# Patient Record
Sex: MALE | Race: WHITE | NOT HISPANIC OR LATINO
[De-identification: names, ages, dates, MRNs, and addresses within clinical notes are randomized per-mention and may not be internally consistent; named-entity substitution may affect disease eponyms.]

---

## 2017-01-04 ENCOUNTER — TRANSCRIPTION ENCOUNTER (OUTPATIENT)
Age: 32
End: 2017-01-04

## 2017-01-04 ENCOUNTER — INPATIENT (INPATIENT)
Facility: HOSPITAL | Age: 32
LOS: 0 days | Discharge: HOME | DRG: 274 | End: 2017-01-05
Attending: EMERGENCY MEDICINE | Admitting: INTERNAL MEDICINE
Payer: COMMERCIAL

## 2017-01-04 VITALS
RESPIRATION RATE: 18 BRPM | HEART RATE: 74 BPM | TEMPERATURE: 98 F | OXYGEN SATURATION: 99 % | DIASTOLIC BLOOD PRESSURE: 79 MMHG | SYSTOLIC BLOOD PRESSURE: 134 MMHG

## 2017-01-04 DIAGNOSIS — I51.9 HEART DISEASE, UNSPECIFIED: Chronic | ICD-10-CM

## 2017-01-04 DIAGNOSIS — Z98.890 OTHER SPECIFIED POSTPROCEDURAL STATES: Chronic | ICD-10-CM

## 2017-01-04 DIAGNOSIS — I48.0 PAROXYSMAL ATRIAL FIBRILLATION: ICD-10-CM

## 2017-01-04 LAB
BLD GP AB SCN SERPL QL: SIGNIFICANT CHANGE UP
TYPE + AB SCN PNL BLD: SIGNIFICANT CHANGE UP

## 2017-01-04 PROCEDURE — 93010 ELECTROCARDIOGRAM REPORT: CPT

## 2017-01-04 RX ORDER — INFLUENZA VIRUS VACCINE 15; 15; 15; 15 UG/.5ML; UG/.5ML; UG/.5ML; UG/.5ML
0.5 SUSPENSION INTRAMUSCULAR ONCE
Qty: 0 | Refills: 0 | Status: COMPLETED | OUTPATIENT
Start: 2017-01-04 | End: 2017-01-05

## 2017-01-04 RX ORDER — COLCHICINE 0.6 MG
0.6 TABLET ORAL DAILY
Qty: 0 | Refills: 0 | Status: DISCONTINUED | OUTPATIENT
Start: 2017-01-04 | End: 2017-01-05

## 2017-01-04 RX ORDER — ONDANSETRON 8 MG/1
4 TABLET, FILM COATED ORAL ONCE
Qty: 0 | Refills: 0 | Status: DISCONTINUED | OUTPATIENT
Start: 2017-01-04 | End: 2017-01-05

## 2017-01-04 RX ORDER — ACETAMINOPHEN 500 MG
650 TABLET ORAL EVERY 6 HOURS
Qty: 0 | Refills: 0 | Status: DISCONTINUED | OUTPATIENT
Start: 2017-01-04 | End: 2017-01-05

## 2017-01-04 RX ORDER — FENTANYL CITRATE 50 UG/ML
25 INJECTION INTRAVENOUS
Qty: 0 | Refills: 0 | Status: DISCONTINUED | OUTPATIENT
Start: 2017-01-04 | End: 2017-01-05

## 2017-01-04 RX ORDER — APIXABAN 2.5 MG/1
5 TABLET, FILM COATED ORAL
Qty: 0 | Refills: 0 | Status: DISCONTINUED | OUTPATIENT
Start: 2017-01-04 | End: 2017-01-05

## 2017-01-04 RX ORDER — LANOLIN ALCOHOL/MO/W.PET/CERES
3 CREAM (GRAM) TOPICAL ONCE
Qty: 0 | Refills: 0 | Status: COMPLETED | OUTPATIENT
Start: 2017-01-04 | End: 2017-01-04

## 2017-01-04 RX ORDER — ALPRAZOLAM 0.25 MG
0.25 TABLET ORAL EVERY 6 HOURS
Qty: 0 | Refills: 0 | Status: DISCONTINUED | OUTPATIENT
Start: 2017-01-04 | End: 2017-01-05

## 2017-01-04 RX ORDER — PANTOPRAZOLE SODIUM 20 MG/1
40 TABLET, DELAYED RELEASE ORAL
Qty: 0 | Refills: 0 | Status: DISCONTINUED | OUTPATIENT
Start: 2017-01-04 | End: 2017-01-05

## 2017-01-04 RX ORDER — ACETAMINOPHEN 500 MG
1000 TABLET ORAL ONCE
Qty: 0 | Refills: 0 | Status: COMPLETED | OUTPATIENT
Start: 2017-01-04 | End: 2017-01-04

## 2017-01-04 RX ORDER — BENZOCAINE AND MENTHOL 5; 1 G/100ML; G/100ML
1 LIQUID ORAL
Qty: 0 | Refills: 0 | Status: DISCONTINUED | OUTPATIENT
Start: 2017-01-04 | End: 2017-01-05

## 2017-01-04 RX ORDER — METOPROLOL TARTRATE 50 MG
5 TABLET ORAL ONCE
Qty: 0 | Refills: 0 | Status: COMPLETED | OUTPATIENT
Start: 2017-01-04 | End: 2017-01-04

## 2017-01-04 RX ADMIN — Medication 400 MILLIGRAM(S): at 14:07

## 2017-01-04 RX ADMIN — APIXABAN 5 MILLIGRAM(S): 2.5 TABLET, FILM COATED ORAL at 18:00

## 2017-01-04 RX ADMIN — Medication 3 MILLIGRAM(S): at 22:46

## 2017-01-04 RX ADMIN — FENTANYL CITRATE 25 MICROGRAM(S): 50 INJECTION INTRAVENOUS at 13:30

## 2017-01-04 RX ADMIN — Medication 650 MILLIGRAM(S): at 18:05

## 2017-01-04 RX ADMIN — Medication 650 MILLIGRAM(S): at 17:05

## 2017-01-04 RX ADMIN — BENZOCAINE AND MENTHOL 1 LOZENGE: 5; 1 LIQUID ORAL at 18:00

## 2017-01-04 RX ADMIN — FENTANYL CITRATE 25 MICROGRAM(S): 50 INJECTION INTRAVENOUS at 14:45

## 2017-01-04 RX ADMIN — FENTANYL CITRATE 25 MICROGRAM(S): 50 INJECTION INTRAVENOUS at 15:08

## 2017-01-04 RX ADMIN — PANTOPRAZOLE SODIUM 40 MILLIGRAM(S): 20 TABLET, DELAYED RELEASE ORAL at 22:46

## 2017-01-04 RX ADMIN — FENTANYL CITRATE 25 MICROGRAM(S): 50 INJECTION INTRAVENOUS at 14:37

## 2017-01-04 RX ADMIN — Medication 5 MILLIGRAM(S): at 21:28

## 2017-01-04 RX ADMIN — Medication 1000 MILLIGRAM(S): at 15:08

## 2017-01-04 NOTE — PROGRESS NOTE ADULT - SUBJECTIVE AND OBJECTIVE BOX
S/P successful afib ablation  H/O recent AVNRT ablation  Plan eliquis tonight  Colchicine and protonix 1 month  Telemetry overnight  Discharge in AM   Follow up in 4 weeks as outpatient S/P successful afib ablation  H/O recent AVNRT ablation  Plan eliquis tonight  Colchicine and protonix 1 month  Telemetry overnight   Right and left groin sites benign  Discharge in AM if stable  Follow up in 4 weeks as outpatient

## 2017-01-04 NOTE — DISCHARGE NOTE ADULT - MEDICATION SUMMARY - MEDICATIONS TO TAKE
I will START or STAY ON the medications listed below when I get home from the hospital:    acetaminophen 325 mg oral tablet  -- 2 tab(s) by mouth every 6 hours, As needed, Mild Pain (1 - 3)  -- Indication: For Post ablation pain    Eliquis 5 mg oral tablet  -- 1 tab(s) by mouth 2 times a day  -- Indication: For Paroxysmal atrial fibrillation    colchicine 0.6 mg oral tablet  -- 1 tab(s) by mouth once a day MDD:Take for 30 days then STOP.  -- Indication: For Post ablation    pantoprazole 40 mg oral delayed release tablet  -- 1 tab(s) by mouth once a day (before a meal)  Take for 30 days, then STOP.   -- Indication: For Post ablation GI prophylaxis

## 2017-01-04 NOTE — DISCHARGE NOTE ADULT - NS AS ACTIVITY OBS
Showering allowed/Return to Work/School allowed/No Heavy lifting/straining/Walking-Indoors allowed/Walking-Outdoors allowed/Stairs allowed

## 2017-01-04 NOTE — DISCHARGE NOTE ADULT - INSTRUCTIONS
Choose lean meats and poultry without skin and prepare them without added saturated and trans fat.  Eat fish at least twice a week. Recent research shows that eating oily fish containing omega-3 fatty acids (for example, salmon, trout and herring) may help lower your risk of death from coronary artery disease.  Select fat-free, 1 percent fat and low-fat dairy products.  Cut back on foods containing partially hydrogenated vegetable oils to reduce trans fat in your diet.   To lower cholesterol, reduce saturated fat to no more than 5 to 6 percent of total calories. For someone eating 2,000 calories a day, that’s about 13 grams of saturated fat.  Cut back on beverages and foods with added sugars.  Choose and prepare foods with little or no salt. To lower blood pressure, aim to eat no more than 2,400 milligrams of sodium per day. Reducing daily intake to 1,500 mg is desirable because it can lower blood pressure even further.  If you drink alcohol, drink in moderation. That means one drink per day if you’re a woman and two drinks  per day if you’re a man.  Follow the American Heart Association recommendations when you eat out, and keep an eye on your portion sizes.

## 2017-01-04 NOTE — DISCHARGE NOTE ADULT - CARE PLAN
Principal Discharge DX:	Paroxysmal atrial fibrillation  Goal:	Minimize arrhythmia burden and optimize cardiac health  Instructions for follow-up, activity and diet:	- Bruising at the groin, sometimes extending down the leg, and/or a small lump under the skin at the groin access site is normal and will resolve within 2 – 3 weeks.   - Occasional skipped beats or palpitations that last for a few beats are common and generally resolve within 1-2 months.   - You make walk and take stairs at a regular pace.   - Do not perform any exercise more strenuous than walking for 1 week.   - Do not strain or lift heavy objects for 1 week.  - You may shower the day after the procedure.  - Do not soak in water (such as tub baths, hot tubs, swimming, etc.) for 1 week.   - You may resume all other activities the day after the procedure.  Call your doctor if:   - you notice bleeding, redness, drainage, swelling, increased tenderness or a hot sensation around the catheter insertion site.   - your temperature is greater than 100 degrees F for more than 24 hours.  - your rapid heart rhythm returns.  - you have any questions or concerns regarding the procedure.  If significant bleeding and/or a large lump (the size of a golf ball or bigger) occurs:  - Lie flat and apply continuous direct pressure just above the puncture site for at least 10 minutes  - If the issue resolves, notify your physician immediately.    - If the bleeding cannot be controlled, please seek immediate medical attention.  If you experience increased difficulty breathing or chest pain, or if you faint or have dizzy spells, please seek immediate medical attention.

## 2017-01-04 NOTE — DISCHARGE NOTE ADULT - PLAN OF CARE
Minimize arrhythmia burden and optimize cardiac health - Bruising at the groin, sometimes extending down the leg, and/or a small lump under the skin at the groin access site is normal and will resolve within 2 – 3 weeks.   - Occasional skipped beats or palpitations that last for a few beats are common and generally resolve within 1-2 months.   - You make walk and take stairs at a regular pace.   - Do not perform any exercise more strenuous than walking for 1 week.   - Do not strain or lift heavy objects for 1 week.  - You may shower the day after the procedure.  - Do not soak in water (such as tub baths, hot tubs, swimming, etc.) for 1 week.   - You may resume all other activities the day after the procedure.  Call your doctor if:   - you notice bleeding, redness, drainage, swelling, increased tenderness or a hot sensation around the catheter insertion site.   - your temperature is greater than 100 degrees F for more than 24 hours.  - your rapid heart rhythm returns.  - you have any questions or concerns regarding the procedure.  If significant bleeding and/or a large lump (the size of a golf ball or bigger) occurs:  - Lie flat and apply continuous direct pressure just above the puncture site for at least 10 minutes  - If the issue resolves, notify your physician immediately.    - If the bleeding cannot be controlled, please seek immediate medical attention.  If you experience increased difficulty breathing or chest pain, or if you faint or have dizzy spells, please seek immediate medical attention.

## 2017-01-04 NOTE — DISCHARGE NOTE ADULT - HOSPITAL COURSE
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 Episodes of paroxysmal atrial fibrillation correlating with palpitations were noted on the ILR. The patient presented electively 1/4/16 for cryoablation of atrial fibrillation, which was performed without acute complication. 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 Episodes of paroxysmal atrial fibrillation correlating with palpitations were noted on the ILR. The patient presented electively 1/4/16 for cryoablation of atrial fibrillation, which was performed without acute complication. The patient was observed overnight without event and was discharged home the following morning with a plan for outpatient follow up.

## 2017-01-04 NOTE — DISCHARGE NOTE ADULT - PATIENT PORTAL LINK FT
“You can access the FollowHealth Patient Portal, offered by [facility], by registering with the following website: [email or web address]”

## 2017-01-04 NOTE — DISCHARGE NOTE ADULT - CARE PROVIDER_API CALL
George Dent (MD), Cardiac Electrophysiology; Cardiovascular Disease; Internal Medicine  [address] [place] [address]  Phone: [phone]  Fax: [phone]

## 2017-01-05 VITALS — RESPIRATION RATE: 20 BRPM | OXYGEN SATURATION: 96 % | HEART RATE: 94 BPM

## 2017-01-05 LAB
ANION GAP SERPL CALC-SCNC: 12 MMOL/L — SIGNIFICANT CHANGE UP (ref 5–17)
BUN SERPL-MCNC: 13 MG/DL — SIGNIFICANT CHANGE UP (ref 8–20)
CALCIUM SERPL-MCNC: 8.7 MG/DL — SIGNIFICANT CHANGE UP (ref 8.6–10.2)
CHLORIDE SERPL-SCNC: 104 MMOL/L — SIGNIFICANT CHANGE UP (ref 98–107)
CO2 SERPL-SCNC: 23 MMOL/L — SIGNIFICANT CHANGE UP (ref 22–29)
CREAT SERPL-MCNC: 0.73 MG/DL — SIGNIFICANT CHANGE UP (ref 0.5–1.3)
GLUCOSE SERPL-MCNC: 133 MG/DL — HIGH (ref 70–115)
HCT VFR BLD CALC: 41.9 % — LOW (ref 42–52)
HGB BLD-MCNC: 14.6 G/DL — SIGNIFICANT CHANGE UP (ref 14–18)
MAGNESIUM SERPL-MCNC: 1.9 MG/DL — SIGNIFICANT CHANGE UP (ref 1.8–2.5)
MCHC RBC-ENTMCNC: 30.5 PG — SIGNIFICANT CHANGE UP (ref 27–31)
MCHC RBC-ENTMCNC: 34.8 G/DL — SIGNIFICANT CHANGE UP (ref 32–36)
MCV RBC AUTO: 87.7 FL — SIGNIFICANT CHANGE UP (ref 80–94)
PLATELET # BLD AUTO: 160 K/UL — SIGNIFICANT CHANGE UP (ref 150–400)
POTASSIUM SERPL-MCNC: 3.9 MMOL/L — SIGNIFICANT CHANGE UP (ref 3.5–5.3)
POTASSIUM SERPL-SCNC: 3.9 MMOL/L — SIGNIFICANT CHANGE UP (ref 3.5–5.3)
RBC # BLD: 4.78 M/UL — SIGNIFICANT CHANGE UP (ref 4.6–6.2)
RBC # FLD: 12.8 % — SIGNIFICANT CHANGE UP (ref 11–15.6)
SODIUM SERPL-SCNC: 139 MMOL/L — SIGNIFICANT CHANGE UP (ref 135–145)
WBC # BLD: 14.12 K/UL — HIGH (ref 4.8–10.8)
WBC # FLD AUTO: 14.12 K/UL — HIGH (ref 4.8–10.8)

## 2017-01-05 PROCEDURE — C1730: CPT

## 2017-01-05 PROCEDURE — 93662 INTRACARDIAC ECG (ICE): CPT

## 2017-01-05 PROCEDURE — 80048 BASIC METABOLIC PNL TOTAL CA: CPT

## 2017-01-05 PROCEDURE — 36415 COLL VENOUS BLD VENIPUNCTURE: CPT

## 2017-01-05 PROCEDURE — 93005 ELECTROCARDIOGRAM TRACING: CPT

## 2017-01-05 PROCEDURE — C1894: CPT

## 2017-01-05 PROCEDURE — 83735 ASSAY OF MAGNESIUM: CPT

## 2017-01-05 PROCEDURE — 86920 COMPATIBILITY TEST SPIN: CPT

## 2017-01-05 PROCEDURE — 93656 COMPRE EP EVAL ABLTJ ATR FIB: CPT

## 2017-01-05 PROCEDURE — 86850 RBC ANTIBODY SCREEN: CPT

## 2017-01-05 PROCEDURE — 93613 INTRACARDIAC EPHYS 3D MAPG: CPT

## 2017-01-05 PROCEDURE — 85027 COMPLETE CBC AUTOMATED: CPT

## 2017-01-05 PROCEDURE — C1893: CPT

## 2017-01-05 PROCEDURE — C1766: CPT

## 2017-01-05 PROCEDURE — 86900 BLOOD TYPING SEROLOGIC ABO: CPT

## 2017-01-05 PROCEDURE — 90686 IIV4 VACC NO PRSV 0.5 ML IM: CPT

## 2017-01-05 PROCEDURE — 93010 ELECTROCARDIOGRAM REPORT: CPT

## 2017-01-05 PROCEDURE — C1769: CPT

## 2017-01-05 PROCEDURE — C1733: CPT

## 2017-01-05 PROCEDURE — 86901 BLOOD TYPING SEROLOGIC RH(D): CPT

## 2017-01-05 RX ORDER — COLCHICINE 0.6 MG
1 TABLET ORAL
Qty: 30 | Refills: 0 | OUTPATIENT
Start: 2017-01-05 | End: 2017-02-04

## 2017-01-05 RX ORDER — PANTOPRAZOLE SODIUM 20 MG/1
1 TABLET, DELAYED RELEASE ORAL
Qty: 30 | Refills: 0 | OUTPATIENT
Start: 2017-01-05 | End: 2017-02-04

## 2017-01-05 RX ORDER — MAGNESIUM SULFATE 500 MG/ML
2 VIAL (ML) INJECTION ONCE
Qty: 0 | Refills: 0 | Status: COMPLETED | OUTPATIENT
Start: 2017-01-05 | End: 2017-01-05

## 2017-01-05 RX ORDER — ACETAMINOPHEN 500 MG
2 TABLET ORAL
Qty: 0 | Refills: 0 | COMMUNITY
Start: 2017-01-05

## 2017-01-05 RX ORDER — POTASSIUM CHLORIDE 20 MEQ
20 PACKET (EA) ORAL ONCE
Qty: 0 | Refills: 0 | Status: COMPLETED | OUTPATIENT
Start: 2017-01-05 | End: 2017-01-05

## 2017-01-05 RX ADMIN — Medication 20 MILLIEQUIVALENT(S): at 08:10

## 2017-01-05 RX ADMIN — PANTOPRAZOLE SODIUM 40 MILLIGRAM(S): 20 TABLET, DELAYED RELEASE ORAL at 06:20

## 2017-01-05 RX ADMIN — Medication 50 GRAM(S): at 08:10

## 2017-01-05 RX ADMIN — APIXABAN 5 MILLIGRAM(S): 2.5 TABLET, FILM COATED ORAL at 06:20

## 2017-01-05 RX ADMIN — Medication 0.6 MILLIGRAM(S): at 01:49

## 2017-01-05 RX ADMIN — INFLUENZA VIRUS VACCINE 0.5 MILLILITER(S): 15; 15; 15; 15 SUSPENSION INTRAMUSCULAR at 10:26

## 2017-01-05 NOTE — PROGRESS NOTE ADULT - SUBJECTIVE AND OBJECTIVE BOX
Pt doing well POD #1 s/p cryoablation of atrial fibrillation. Denies complaint.     EKG: sinus rhythm at 90 bpm w/ intact conduction, normal axis & intervals  TELE: sinus rhythm, intact conduction, no acute changes or sustained arrhythmias.     MEDICATIONS  (STANDING):  pantoprazole    Tablet 40milliGRAM(s) Oral before breakfast  apixaban 5milliGRAM(s) Oral <User Schedule>  colchicine 0.6milliGRAM(s) Oral daily  influenza   Vaccine 0.5milliLiter(s) IntraMuscular once  potassium chloride    Tablet ER 20milliEquivalent(s) Oral once  magnesium sulfate  IVPB 2Gram(s) IV Intermittent once    MEDICATIONS  (PRN):  ondansetron Injectable 4milliGRAM(s) IV Push once PRN Nausea and/or Vomiting  oxyCODONE  5 mG/acetaminophen 325 mG 2Tablet(s) Oral every 4 hours PRN Severe Pain (7 - 10)  fentaNYL    Injectable 25MICROGram(s) IV Push every 30 minutes PRN Severe Pain (7 - 10)  acetaminophen   Tablet. 650milliGRAM(s) Oral every 6 hours PRN Mild Pain (1 - 3)  oxyCODONE  5 mG/acetaminophen 325 mG 1Tablet(s) Oral every 4 hours PRN Moderate Pain (4 - 6)  ALPRAZolam 0.25milliGRAM(s) Oral every 6 hours PRN anxiety or insomnia  benzocaine 15 mG/menthol 3.6 mG Lozenge 1Lozenge Oral every 2 hours PRN Sore Throat      Allergies  No Known Drug Allergies  bee stings (Anaphylaxis)  shrimp (Anaphylaxis; Rash; Urticaria)      PAST MEDICAL & SURGICAL HISTORY:  SVT (supraventricular tachycardia): May 2016 ablation  Dizziness  CHAMBERS (dyspnea on exertion)  Palpitations  Paroxysmal a-fib: Insertion of [employer] Loop Linq cardiac event recorder Nov 2016  Afib: PAF captured on ILR 25% AF over 24 hours  Asthma  H/O atrioventricular montserrat ablation: SVT ablation May 2016  Cardiac disorder: [employer] Loop Linq  mplantable loop recorder Nov. 2016  No significant past surgical history      Vital Signs Last 24 Hrs  T(C): 36.6, Max: 36.9 (01-04 @ 08:15)  T(F): 97.9, Max: 98.4 (01-04 @ 08:15)  HR: 91 (74 - 120)  BP: 124/68 (124/68 - 140/81)  BP(mean): 91 (91 - 105)  RR: 21 (12 - 36)  SpO2: 96% (94% - 99%)    Physical Exam:  Constitutional: NAD, AAOx3  Cardiovascular: +S1S2 RRR  Pulmonary: CTA b/l, unlabored  Abd: soft NTND +BS  Groins: C/D/I bilaterally; no bleeding, hematoma, edema  Extremities: no pedal edema, +distal pulses b/l  Neuro: non focal, VILCHIS x4    LABS:                        14.6   14.12 )-----------( 160      ( 05 Jan 2017 04:02 )             41.9     05 Jan 2017 04:02    139    |  104    |  13.0   ----------------------------<  133    3.9     |  23.0   |  0.73     Ca    8.7        05 Jan 2017 04:02  Mg     1.9       05 Jan 2017 04:02      Assessment:   30 y/o male h/o  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     Plan:   Replete Mg & K   OOB w/ assist now; progress as tolerated.   Continue Eliquis - importance of strict compliance reinforced w/ pt who expressed understanding.   Protonix & colchicine x 1 month.   Access site care and activity limitations reviewed w/ pt.   Outpt f/up w/ Dr. Rojas within 4 weeks.   Anticipate d/c home following successful ambulation.

## 2017-01-05 NOTE — PROGRESS NOTE ADULT - SUBJECTIVE AND OBJECTIVE BOX
Patient is a 31y old  Male who presents with a chief complaint of elective cryoablation of symptomatic atrial fibrillation. (04 Jan 2017 15:36)    PAST MEDICAL & SURGICAL HISTORY:  SVT (supraventricular tachycardia): May 2016 ablation  Dizziness  CHAMBERS (dyspnea on exertion)  Palpitations  Paroxysmal a-fib: Insertion of [employer] Loop Linq cardiac event recorder Nov 2016  Afib: PAF captured on ILR 25% AF over 24 hours  Asthma  H/O atrioventricular montserrat ablation: SVT ablation May 2016  Cardiac disorder: Med[employer] Loop Linq  mplantable loop recorder Nov. 2016  No significant past surgical history      BRIEF HOSPITAL COURSE:   s/p afib ablation in SR but tachy     Events last 24 hours:   BB given with good effect      Review of Systems:  All  ROS are negative.  Medications:        ondansetron Injectable 4milliGRAM(s) IV Push once PRN  oxyCODONE  5 mG/acetaminophen 325 mG 2Tablet(s) Oral every 4 hours PRN  fentaNYL    Injectable 25MICROGram(s) IV Push every 30 minutes PRN  acetaminophen   Tablet. 650milliGRAM(s) Oral every 6 hours PRN  oxyCODONE  5 mG/acetaminophen 325 mG 1Tablet(s) Oral every 4 hours PRN  ALPRAZolam 0.25milliGRAM(s) Oral every 6 hours PRN      apixaban 5milliGRAM(s) Oral <User Schedule>    pantoprazole    Tablet 40milliGRAM(s) Oral before breakfast      colchicine 0.6milliGRAM(s) Oral daily      influenza   Vaccine 0.5milliLiter(s) IntraMuscular once    benzocaine 15 mG/menthol 3.6 mG Lozenge 1Lozenge Oral every 2 hours PRN        ICU Vital Signs Last 24 Hrs  T(C): 36.5, Max: 36.9 (01-04 @ 08:15)  T(F): 97.7, Max: 98.4 (01-04 @ 08:15)  HR: 104 (74 - 120)  BP: 140/81 (127/73 - 140/81)  BP(mean): 105 (98 - 105)  ABP: 138/72 (131/69 - 179/101)  ABP(mean): 95 (87 - 124)  RR: 24 (14 - 36)  SpO2: 95% (94% - 99%)    Physical Examination:    General:     HEENT:   no JVD    PULM: clear     CVS: s1 s2 tachy     ABD: soft   groins without hematoma      EXT: warm no edema     SKIN:  warm     Neuro: alert non focal        LABS:

## 2017-02-12 ENCOUNTER — EMERGENCY (EMERGENCY)
Facility: HOSPITAL | Age: 32
LOS: 1 days | Discharge: HOME | End: 2017-02-12
Attending: STUDENT IN AN ORGANIZED HEALTH CARE EDUCATION/TRAINING PROGRAM
Payer: COMMERCIAL

## 2017-02-12 VITALS
DIASTOLIC BLOOD PRESSURE: 103 MMHG | HEIGHT: 70 IN | SYSTOLIC BLOOD PRESSURE: 147 MMHG | WEIGHT: 214.95 LBS | OXYGEN SATURATION: 97 % | HEART RATE: 135 BPM | RESPIRATION RATE: 20 BRPM | TEMPERATURE: 98 F

## 2017-02-12 VITALS — HEART RATE: 73 BPM

## 2017-02-12 DIAGNOSIS — S01.01XA LACERATION WITHOUT FOREIGN BODY OF SCALP, INITIAL ENCOUNTER: ICD-10-CM

## 2017-02-12 DIAGNOSIS — Z91.030 BEE ALLERGY STATUS: ICD-10-CM

## 2017-02-12 DIAGNOSIS — Z98.890 OTHER SPECIFIED POSTPROCEDURAL STATES: Chronic | ICD-10-CM
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
## 2017-02-12 DIAGNOSIS — J45.909 UNSPECIFIED ASTHMA, UNCOMPLICATED: ICD-10-CM
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
## 2017-02-12 DIAGNOSIS — I51.9 HEART DISEASE, UNSPECIFIED: Chronic | ICD-10-CM

## 2017-02-12 DIAGNOSIS — Z91.013 ALLERGY TO SEAFOOD: ICD-10-CM

## 2017-02-12 DIAGNOSIS — I48.91 UNSPECIFIED ATRIAL FIBRILLATION: ICD-10-CM
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
## 2017-02-12 PROCEDURE — 93010 ELECTROCARDIOGRAM REPORT: CPT

## 2017-02-12 PROCEDURE — 70450 CT HEAD/BRAIN W/O DYE: CPT | Mod: 26

## 2017-02-12 PROCEDURE — 12001 RPR S/N/AX/GEN/TRNK 2.5CM/<: CPT

## 2017-02-12 PROCEDURE — 70450 CT HEAD/BRAIN W/O DYE: CPT

## 2017-02-12 PROCEDURE — 99284 EMERGENCY DEPT VISIT MOD MDM: CPT | Mod: 25

## 2017-02-12 PROCEDURE — 93005 ELECTROCARDIOGRAM TRACING: CPT

## 2017-02-12 NOTE — ED PROVIDER NOTE - OBJECTIVE STATEMENT
32 yo male with slip and a fall on ice when he got home this morning presents for evaluation due to bleeding head wound.

## 2017-02-12 NOTE — ED ADULT NURSE NOTE - PSH
Cardiac disorder  [employer] Loop Linq  mplantable loop recorder Nov. 2016  H/O atrioventricular montserrat ablation  SVT ablation May 2016

## 2017-02-12 NOTE — ED ADULT NURSE NOTE - PMH
Afib  PAF captured on ILR 25% AF over 24 hours  Asthma    Dizziness    CHAMBERS (dyspnea on exertion)    Palpitations    Paroxysmal a-fib  Insertion of [employer] Loop Linq cardiac event recorder Nov 2016  SVT (supraventricular tachycardia)  May 2016 ablation

## 2017-02-12 NOTE — ED PROVIDER NOTE - PSH
Cardiac disorder  [employer] Loop Linq  mplantable loop recorder Nov. 2016  H/O atrioventricular montserrat ablation  SVT ablation May 2016

## 2017-02-12 NOTE — ED ADULT NURSE NOTE - OBJECTIVE STATEMENT
pt reports fall and slip on ice, hit head hematoma top of head, no active bleeding at this time, pt on elaquis

## 2017-02-12 NOTE — ED PROVIDER NOTE - PMH
Afib  PAF captured on ILR 25% AF over 24 hours  Asthma    Dizziness    CHAMBERS (dyspnea on exertion)    Palpitations    Paroxysmal a-fib  Insertion of [employer] Loop Linq cardiac event recorder Nov 2016  SVT (supraventricular tachycardia)  May 2016 ablation

## 2017-02-12 NOTE — ED ADULT TRIAGE NOTE - CHIEF COMPLAINT QUOTE
slipped on ice hit  head lac  eliqis no loc slipped on ice hit  head lac   im on eliqis no loc slipped on ice hit  head lac   im on eliqis no loc  hx afib ablation x 2

## 2017-03-06 ENCOUNTER — RECORD ABSTRACTING (OUTPATIENT)
Age: 32
End: 2017-03-06

## 2017-03-06 DIAGNOSIS — Z86.79 PERSONAL HISTORY OF OTHER DISEASES OF THE CIRCULATORY SYSTEM: ICD-10-CM

## 2017-03-06 DIAGNOSIS — Z87.898 PERSONAL HISTORY OF OTHER SPECIFIED CONDITIONS: ICD-10-CM

## 2017-03-08 ENCOUNTER — APPOINTMENT (OUTPATIENT)
Dept: ELECTROPHYSIOLOGY | Facility: CLINIC | Age: 32
End: 2017-03-08

## 2017-04-07 ENCOUNTER — APPOINTMENT (OUTPATIENT)
Dept: ELECTROPHYSIOLOGY | Facility: CLINIC | Age: 32
End: 2017-04-07
Payer: COMMERCIAL

## 2017-04-07 PROCEDURE — 93298 REM INTERROG DEV EVAL SCRMS: CPT

## 2017-05-08 ENCOUNTER — APPOINTMENT (OUTPATIENT)
Dept: ELECTROPHYSIOLOGY | Facility: CLINIC | Age: 32
End: 2017-05-08

## 2017-06-12 ENCOUNTER — APPOINTMENT (OUTPATIENT)
Dept: ELECTROPHYSIOLOGY | Facility: CLINIC | Age: 32
End: 2017-06-12

## 2017-07-10 ENCOUNTER — APPOINTMENT (OUTPATIENT)
Dept: ELECTROPHYSIOLOGY | Facility: CLINIC | Age: 32
End: 2017-07-10
Payer: COMMERCIAL

## 2017-07-10 ENCOUNTER — APPOINTMENT (OUTPATIENT)
Dept: ELECTROPHYSIOLOGY | Facility: CLINIC | Age: 32
End: 2017-07-10

## 2017-07-10 PROCEDURE — 93298 REM INTERROG DEV EVAL SCRMS: CPT

## 2017-08-07 ENCOUNTER — APPOINTMENT (OUTPATIENT)
Dept: ELECTROPHYSIOLOGY | Facility: CLINIC | Age: 32
End: 2017-08-07
Payer: COMMERCIAL

## 2017-08-07 PROCEDURE — 93298 REM INTERROG DEV EVAL SCRMS: CPT

## 2017-09-05 ENCOUNTER — APPOINTMENT (OUTPATIENT)
Dept: ELECTROPHYSIOLOGY | Facility: CLINIC | Age: 32
End: 2017-09-05
Payer: COMMERCIAL

## 2017-09-05 PROCEDURE — 93298 REM INTERROG DEV EVAL SCRMS: CPT

## 2017-10-05 ENCOUNTER — APPOINTMENT (OUTPATIENT)
Dept: ELECTROPHYSIOLOGY | Facility: CLINIC | Age: 32
End: 2017-10-05
Payer: COMMERCIAL

## 2017-10-05 PROCEDURE — 93298 REM INTERROG DEV EVAL SCRMS: CPT

## 2017-10-09 ENCOUNTER — APPOINTMENT (OUTPATIENT)
Dept: ELECTROPHYSIOLOGY | Facility: CLINIC | Age: 32
End: 2017-10-09
Payer: COMMERCIAL

## 2017-10-09 VITALS
SYSTOLIC BLOOD PRESSURE: 145 MMHG | DIASTOLIC BLOOD PRESSURE: 86 MMHG | HEIGHT: 70 IN | BODY MASS INDEX: 32.21 KG/M2 | HEART RATE: 83 BPM | OXYGEN SATURATION: 97 % | WEIGHT: 225 LBS

## 2017-10-09 PROCEDURE — 93285 PRGRMG DEV EVAL SCRMS IP: CPT

## 2017-10-09 PROCEDURE — 99214 OFFICE O/P EST MOD 30 MIN: CPT

## 2017-10-20 ENCOUNTER — TRANSCRIPTION ENCOUNTER (OUTPATIENT)
Age: 32
End: 2017-10-20

## 2017-11-06 ENCOUNTER — APPOINTMENT (OUTPATIENT)
Dept: ELECTROPHYSIOLOGY | Facility: CLINIC | Age: 32
End: 2017-11-06
Payer: COMMERCIAL

## 2017-11-06 PROCEDURE — 93298 REM INTERROG DEV EVAL SCRMS: CPT

## 2017-12-04 ENCOUNTER — APPOINTMENT (OUTPATIENT)
Dept: ELECTROPHYSIOLOGY | Facility: CLINIC | Age: 32
End: 2017-12-04
Payer: COMMERCIAL

## 2017-12-04 PROCEDURE — 93298 REM INTERROG DEV EVAL SCRMS: CPT

## 2018-01-03 ENCOUNTER — APPOINTMENT (OUTPATIENT)
Dept: ELECTROPHYSIOLOGY | Facility: CLINIC | Age: 33
End: 2018-01-03
Payer: COMMERCIAL

## 2018-01-03 PROCEDURE — 93298 REM INTERROG DEV EVAL SCRMS: CPT

## 2018-01-29 ENCOUNTER — NON-APPOINTMENT (OUTPATIENT)
Age: 33
End: 2018-01-29

## 2018-01-29 ENCOUNTER — APPOINTMENT (OUTPATIENT)
Dept: ELECTROPHYSIOLOGY | Facility: CLINIC | Age: 33
End: 2018-01-29
Payer: COMMERCIAL

## 2018-01-29 VITALS
HEART RATE: 75 BPM | BODY MASS INDEX: 32.21 KG/M2 | HEIGHT: 70 IN | WEIGHT: 225 LBS | DIASTOLIC BLOOD PRESSURE: 98 MMHG | SYSTOLIC BLOOD PRESSURE: 147 MMHG

## 2018-01-29 VITALS — WEIGHT: 225 LBS | HEIGHT: 70 IN | BODY MASS INDEX: 32.21 KG/M2

## 2018-01-29 PROCEDURE — 93285 PRGRMG DEV EVAL SCRMS IP: CPT

## 2018-01-29 PROCEDURE — 99214 OFFICE O/P EST MOD 30 MIN: CPT

## 2018-02-02 ENCOUNTER — APPOINTMENT (OUTPATIENT)
Dept: ELECTROPHYSIOLOGY | Facility: CLINIC | Age: 33
End: 2018-02-02
Payer: COMMERCIAL

## 2018-02-02 PROCEDURE — 93298 REM INTERROG DEV EVAL SCRMS: CPT

## 2018-02-02 PROCEDURE — 93299: CPT

## 2018-03-05 ENCOUNTER — APPOINTMENT (OUTPATIENT)
Dept: ELECTROPHYSIOLOGY | Facility: CLINIC | Age: 33
End: 2018-03-05
Payer: COMMERCIAL

## 2018-03-05 PROCEDURE — 93299: CPT

## 2018-03-05 PROCEDURE — 93298 REM INTERROG DEV EVAL SCRMS: CPT

## 2018-03-30 ENCOUNTER — OUTPATIENT (OUTPATIENT)
Dept: OUTPATIENT SERVICES | Facility: HOSPITAL | Age: 33
LOS: 1 days | End: 2018-03-30
Payer: COMMERCIAL

## 2018-03-30 VITALS
RESPIRATION RATE: 18 BRPM | WEIGHT: 220.02 LBS | SYSTOLIC BLOOD PRESSURE: 148 MMHG | OXYGEN SATURATION: 98 % | HEIGHT: 70 IN | DIASTOLIC BLOOD PRESSURE: 78 MMHG | TEMPERATURE: 98 F | HEART RATE: 71 BPM

## 2018-03-30 VITALS
OXYGEN SATURATION: 98 % | RESPIRATION RATE: 16 BRPM | DIASTOLIC BLOOD PRESSURE: 78 MMHG | TEMPERATURE: 98 F | SYSTOLIC BLOOD PRESSURE: 148 MMHG | HEART RATE: 78 BPM

## 2018-03-30 DIAGNOSIS — I51.9 HEART DISEASE, UNSPECIFIED: Chronic | ICD-10-CM

## 2018-03-30 DIAGNOSIS — Z98.890 OTHER SPECIFIED POSTPROCEDURAL STATES: Chronic | ICD-10-CM

## 2018-03-30 DIAGNOSIS — Z01.810 ENCOUNTER FOR PREPROCEDURAL CARDIOVASCULAR EXAMINATION: ICD-10-CM

## 2018-03-30 LAB
ANION GAP SERPL CALC-SCNC: 11 MMOL/L — SIGNIFICANT CHANGE UP (ref 5–17)
BASOPHILS # BLD AUTO: 0 K/UL — SIGNIFICANT CHANGE UP (ref 0–0.2)
BASOPHILS NFR BLD AUTO: 0.5 % — SIGNIFICANT CHANGE UP (ref 0–2)
BLD GP AB SCN SERPL QL: SIGNIFICANT CHANGE UP
BUN SERPL-MCNC: 15 MG/DL — SIGNIFICANT CHANGE UP (ref 8–20)
CALCIUM SERPL-MCNC: 9 MG/DL — SIGNIFICANT CHANGE UP (ref 8.6–10.2)
CHLORIDE SERPL-SCNC: 100 MMOL/L — SIGNIFICANT CHANGE UP (ref 98–107)
CO2 SERPL-SCNC: 26 MMOL/L — SIGNIFICANT CHANGE UP (ref 22–29)
CREAT SERPL-MCNC: 0.78 MG/DL — SIGNIFICANT CHANGE UP (ref 0.5–1.3)
EOSINOPHIL # BLD AUTO: 0.1 K/UL — SIGNIFICANT CHANGE UP (ref 0–0.5)
EOSINOPHIL NFR BLD AUTO: 1.1 % — SIGNIFICANT CHANGE UP (ref 0–5)
GLUCOSE SERPL-MCNC: 100 MG/DL — SIGNIFICANT CHANGE UP (ref 70–115)
HCT VFR BLD CALC: 48.2 % — SIGNIFICANT CHANGE UP (ref 42–52)
HGB BLD-MCNC: 16.2 G/DL — SIGNIFICANT CHANGE UP (ref 14–18)
LYMPHOCYTES # BLD AUTO: 2.7 K/UL — SIGNIFICANT CHANGE UP (ref 1–4.8)
LYMPHOCYTES # BLD AUTO: 43.3 % — SIGNIFICANT CHANGE UP (ref 20–55)
MAGNESIUM SERPL-MCNC: 2 MG/DL — SIGNIFICANT CHANGE UP (ref 1.6–2.6)
MCHC RBC-ENTMCNC: 29.7 PG — SIGNIFICANT CHANGE UP (ref 27–31)
MCHC RBC-ENTMCNC: 33.6 G/DL — SIGNIFICANT CHANGE UP (ref 32–36)
MCV RBC AUTO: 88.4 FL — SIGNIFICANT CHANGE UP (ref 80–94)
MONOCYTES # BLD AUTO: 0.4 K/UL — SIGNIFICANT CHANGE UP (ref 0–0.8)
MONOCYTES NFR BLD AUTO: 7.1 % — SIGNIFICANT CHANGE UP (ref 3–10)
NEUTROPHILS # BLD AUTO: 3 K/UL — SIGNIFICANT CHANGE UP (ref 1.8–8)
NEUTROPHILS NFR BLD AUTO: 47.7 % — SIGNIFICANT CHANGE UP (ref 37–73)
PLATELET # BLD AUTO: 171 K/UL — SIGNIFICANT CHANGE UP (ref 150–400)
POTASSIUM SERPL-MCNC: 4.4 MMOL/L — SIGNIFICANT CHANGE UP (ref 3.5–5.3)
POTASSIUM SERPL-SCNC: 4.4 MMOL/L — SIGNIFICANT CHANGE UP (ref 3.5–5.3)
RBC # BLD: 5.45 M/UL — SIGNIFICANT CHANGE UP (ref 4.6–6.2)
RBC # FLD: 12.7 % — SIGNIFICANT CHANGE UP (ref 11–15.6)
SODIUM SERPL-SCNC: 137 MMOL/L — SIGNIFICANT CHANGE UP (ref 135–145)
TYPE + AB SCN PNL BLD: SIGNIFICANT CHANGE UP
WBC # BLD: 6.3 K/UL — SIGNIFICANT CHANGE UP (ref 4.8–10.8)
WBC # FLD AUTO: 6.3 K/UL — SIGNIFICANT CHANGE UP (ref 4.8–10.8)

## 2018-03-30 PROCEDURE — 93005 ELECTROCARDIOGRAM TRACING: CPT

## 2018-03-30 PROCEDURE — 86901 BLOOD TYPING SEROLOGIC RH(D): CPT

## 2018-03-30 PROCEDURE — 86900 BLOOD TYPING SEROLOGIC ABO: CPT

## 2018-03-30 PROCEDURE — G0463: CPT

## 2018-03-30 PROCEDURE — 36415 COLL VENOUS BLD VENIPUNCTURE: CPT

## 2018-03-30 PROCEDURE — 85027 COMPLETE CBC AUTOMATED: CPT

## 2018-03-30 PROCEDURE — 93010 ELECTROCARDIOGRAM REPORT: CPT

## 2018-03-30 PROCEDURE — 80048 BASIC METABOLIC PNL TOTAL CA: CPT

## 2018-03-30 PROCEDURE — 86850 RBC ANTIBODY SCREEN: CPT

## 2018-03-30 PROCEDURE — 83735 ASSAY OF MAGNESIUM: CPT

## 2018-03-30 NOTE — H&P PST ADULT - PSH
Cardiac disorder  [employer] Loop Linq  mplantable loop recorder Nov. 2016  H/O atrioventricular montserrat ablation  SVT ablation May 2016

## 2018-03-30 NOTE — H&P PST ADULT - ASSESSMENT
31 yo male with pmhx exercise induced asthma and a long standing history of palpitation s/p ILR implant 11/2016, [facility] and cryo AF ablation (PVI-[id number]). Pt has been having infrequent episodes of regular NCT >200bpm seen on ILR. He presents today for elective PST for EPS+/- ablation.      -npo after midnight prior to procedure

## 2018-03-30 NOTE — H&P PST ADULT - PMH
Asthma    AVNRT (AV montserrat re-entry tachycardia)  s/p slow path mod 5/2016 -[facility]  History of loop recorder  11/2016 -Filipe- Antoine  Palpitations    Paroxysmal a-fib  s/p ablation [id number]

## 2018-03-30 NOTE — H&P PST ADULT - HISTORY OF PRESENT ILLNESS
TTE 3/2/16: EF 55-60%, LA 3.9cm, mild MR TTE 3/2/16: EF 55-60%, LA 3.9cm, mild MR   denies prior stress test or cath 31 yo male with pmhx exercise induced asthma and a long standing history of palpitation s/p ILR implant 11/2016, [facility] and cryo AF ablation (PVI-[id number]). Pt has been having infrequent episodes of regular NCT >200bpm seen on ILR. They occur during exercise and break on their own. He notices "my heart racing and some SOB, different then my regular heart beat." He has been intolerant to CCB and Beta blockers. He was on short term sotalol post AF ablation, which was discontinued at follow up.  He presents today for elective PST for EPS+/- ablation. He feels well today.    TTE 3/2/16: EF 55-60%, LA 3.9cm, mild MR   denies prior stress test or cath

## 2018-03-30 NOTE — ASU PATIENT PROFILE, ADULT - PSH
Cardiac disorder  [employer] Loop Linq  mplantable loop recorder Nov. 2016  H/O atrioventricular montserrat ablation  SVT ablation May 2016

## 2018-04-03 ENCOUNTER — APPOINTMENT (OUTPATIENT)
Dept: ELECTROPHYSIOLOGY | Facility: CLINIC | Age: 33
End: 2018-04-03
Payer: COMMERCIAL

## 2018-04-03 PROCEDURE — 93299: CPT

## 2018-04-03 PROCEDURE — 93298 REM INTERROG DEV EVAL SCRMS: CPT

## 2018-04-04 ENCOUNTER — TRANSCRIPTION ENCOUNTER (OUTPATIENT)
Age: 33
End: 2018-04-04

## 2018-04-04 ENCOUNTER — OUTPATIENT (OUTPATIENT)
Dept: OUTPATIENT SERVICES | Facility: HOSPITAL | Age: 33
LOS: 1 days | End: 2018-04-04
Payer: COMMERCIAL

## 2018-04-04 VITALS
OXYGEN SATURATION: 100 % | DIASTOLIC BLOOD PRESSURE: 55 MMHG | RESPIRATION RATE: 16 BRPM | HEART RATE: 72 BPM | SYSTOLIC BLOOD PRESSURE: 110 MMHG

## 2018-04-04 VITALS — HEART RATE: 96 BPM | SYSTOLIC BLOOD PRESSURE: 143 MMHG | DIASTOLIC BLOOD PRESSURE: 87 MMHG | RESPIRATION RATE: 16 BRPM

## 2018-04-04 DIAGNOSIS — I51.9 HEART DISEASE, UNSPECIFIED: Chronic | ICD-10-CM

## 2018-04-04 DIAGNOSIS — I47.1 SUPRAVENTRICULAR TACHYCARDIA: ICD-10-CM

## 2018-04-04 DIAGNOSIS — Z98.890 OTHER SPECIFIED POSTPROCEDURAL STATES: Chronic | ICD-10-CM

## 2018-04-04 DIAGNOSIS — Z01.810 ENCOUNTER FOR PREPROCEDURAL CARDIOVASCULAR EXAMINATION: ICD-10-CM

## 2018-04-04 LAB
APTT BLD: 33.1 SEC — SIGNIFICANT CHANGE UP (ref 27.5–37.4)
BLD GP AB SCN SERPL QL: SIGNIFICANT CHANGE UP
INR BLD: 1.01 RATIO — SIGNIFICANT CHANGE UP (ref 0.88–1.16)
PROTHROM AB SERPL-ACNC: 11.1 SEC — SIGNIFICANT CHANGE UP (ref 9.8–12.7)
TYPE + AB SCN PNL BLD: SIGNIFICANT CHANGE UP

## 2018-04-04 PROCEDURE — 86901 BLOOD TYPING SEROLOGIC RH(D): CPT

## 2018-04-04 PROCEDURE — 85610 PROTHROMBIN TIME: CPT

## 2018-04-04 PROCEDURE — 93653 COMPRE EP EVAL TX SVT: CPT

## 2018-04-04 PROCEDURE — 93613 INTRACARDIAC EPHYS 3D MAPG: CPT

## 2018-04-04 PROCEDURE — 36415 COLL VENOUS BLD VENIPUNCTURE: CPT

## 2018-04-04 PROCEDURE — 85730 THROMBOPLASTIN TIME PARTIAL: CPT

## 2018-04-04 PROCEDURE — 86923 COMPATIBILITY TEST ELECTRIC: CPT

## 2018-04-04 PROCEDURE — C1730: CPT

## 2018-04-04 PROCEDURE — 86900 BLOOD TYPING SEROLOGIC ABO: CPT

## 2018-04-04 PROCEDURE — 86850 RBC ANTIBODY SCREEN: CPT

## 2018-04-04 PROCEDURE — 93621 COMP EP EVL L PAC&REC C SINS: CPT | Mod: 26

## 2018-04-04 PROCEDURE — 93623 PRGRMD STIMJ&PACG IV RX NFS: CPT | Mod: 26

## 2018-04-04 PROCEDURE — 93005 ELECTROCARDIOGRAM TRACING: CPT

## 2018-04-04 PROCEDURE — 93010 ELECTROCARDIOGRAM REPORT: CPT

## 2018-04-04 RX ORDER — ALPRAZOLAM 0.25 MG
0.25 TABLET ORAL EVERY 6 HOURS
Qty: 0 | Refills: 0 | Status: DISCONTINUED | OUTPATIENT
Start: 2018-04-04 | End: 2018-04-04

## 2018-04-04 RX ORDER — OXYCODONE HYDROCHLORIDE 5 MG/1
5 TABLET ORAL EVERY 4 HOURS
Qty: 0 | Refills: 0 | Status: DISCONTINUED | OUTPATIENT
Start: 2018-04-04 | End: 2018-04-04

## 2018-04-04 RX ORDER — IBUPROFEN 200 MG
800 TABLET ORAL ONCE
Qty: 0 | Refills: 0 | Status: COMPLETED | OUTPATIENT
Start: 2018-04-04 | End: 2018-04-04

## 2018-04-04 RX ORDER — BENZOCAINE AND MENTHOL 5; 1 G/100ML; G/100ML
1 LIQUID ORAL
Qty: 0 | Refills: 0 | Status: DISCONTINUED | OUTPATIENT
Start: 2018-04-04 | End: 2018-04-19

## 2018-04-04 RX ORDER — ACETAMINOPHEN 500 MG
650 TABLET ORAL EVERY 6 HOURS
Qty: 0 | Refills: 0 | Status: DISCONTINUED | OUTPATIENT
Start: 2018-04-04 | End: 2018-04-19

## 2018-04-04 RX ADMIN — Medication 650 MILLIGRAM(S): at 11:28

## 2018-04-04 RX ADMIN — Medication 650 MILLIGRAM(S): at 12:16

## 2018-04-04 RX ADMIN — Medication 800 MILLIGRAM(S): at 14:03

## 2018-04-04 RX ADMIN — Medication 800 MILLIGRAM(S): at 12:16

## 2018-04-04 NOTE — PROGRESS NOTE ADULT - SUBJECTIVE AND OBJECTIVE BOX
PROCEDURE(S): Repeat Slow Pathway Modification Via Radiofrequency Ablation     ELECTRPHYSIOLOGIST(S): Geogre Dent MD         COMPLICATIONS:  none        DISPOSITION:  observation     CONDITION: stable      Pt doing well s/p EPS, which demonstrated dual AV montserrat physiology with echo beats but no inducible tachycardia, and repeat slow pathway ablation. Denies complaint.       MEDICATIONS  (STANDING):  None    MEDICATIONS  (PRN):  acetaminophen   Tablet. 650 milliGRAM(s) Oral every 6 hours PRN Mild Pain (1 - 3)  ALPRAZolam 0.25 milliGRAM(s) Oral every 6 hours PRN anxiety and/or insomnia  aluminum hydroxide/magnesium hydroxide/simethicone Suspension 30 milliLiter(s) Oral every 4 hours PRN Dyspepsia  benzocaine 15 mG/menthol 3.6 mG Lozenge 1 Lozenge Oral every 2 hours PRN Sore Throat  oxyCODONE    IR 5 milliGRAM(s) Oral every 4 hours PRN Moderate Pain (4 - 6)      Allergies  BEE STING (Anaphylaxis)  SHRIMP (Anaphylaxis)      Exam:   HR: 93 (04-04-18 @ 09:44) (72 - 93)  BP: 142/84 (04-04-18 @ 09:44) (110/55 - 142/84)  RR: 16 (04-04-18 @ 09:44) (16 - 16)  SpO2: 97% (04-04-18 @ 09:44) (97% - 100%)  VSS, NAD, A&O x 3  Card: S1/S2, RRR, no m/g/r  Resp: lungs CTA b/l  Abd: S/NT/ND  Groins: hemostatic sutures in place; sites C/D/I; no bleeding, hematoma, erythema, exudate or edema  Ext: no edema; distal pulses intact      Assessment:   31 yo male with pmhx exercise induced asthma and a long standing history of palpitation s/p ILR implant 11/2016, [facility] and cryo AF ablation (PVI-[id number]), with infrequent episodes of recurrent, regular NCT >200bpm seen on ILR.  Now status post uncomplicated EPS, which demonstrated dual AV montserrat physiology with echo beats but no inducible tachycardia, and repeat slow pathway ablation.       Plan:   Bedrest x 4 hours.  Groin sutures to be removed by EP in 4 hours, then OOB with assistance and progress as tolerated.   Pain control w/ PO analgesia PRN.   Please encourage incentive spirometry and ambulation once able.  Observation and monitoring on telemetry during specified bedrest.   Per Dr. Dent and pending uneventful post procedure observation, pt may be discharged home at 1700, with outpt follow up in 1 month.

## 2018-04-04 NOTE — CHART NOTE - NSCHARTNOTEFT_GEN_A_CORE
[address] [address]  Electrophysiology Lab    EP Study and SVT Ablation  Electrophysiologic Study with catheter Ablation    Patient Information    Patient Name		Ty Cho  Study Date		2018  MRN			[id number]  			1985  Age			32yrs  Gender			Male  Electrophysiologist	George Dent MD      Procedure: Diagnostic EP Study and SVT Ablation  Indication: SVT  Anesthesia: As per anesthesiology note and 1% Lidocaine to each groin site  Methods:   The right and left groins were prepped with chorhexidine and then draped via sterile technique.  A 1% lidocaine solution was used to anesthetize each groin site.  Using the modified seldinger technique left and right femoral venous access was obtained and sheaths were placed (see below for further details).  Catheters were brought up to the heart and diagnostic EP testing was performed followed by SVT ablation (please see below for further detail).  At the completion of the procedure the catheters were removed the sheaths were removed and pressure was held and hemostasis obtained.  The patient was observed in the recovery bobo and was noted to be in stable condition.  Equipment:                Sheaths  Left Femoral vein – 5.5 fr Sheath, 5.5 fr Sheath, 5.5 fr Sheath  Right Femoral vein - 8 fr Sheath (8 fr SR0 long sheath exchanged), 7 fr sheath    Catheters            Jsn X2, CRD-2, deflectable decapolar, BioSense 5mm Ablation Catheter  Mapping            	 CARTO/Biosense - 3 D Mapping and conventional mapping     Findings:            Baseline Intervals               SCL – 775ms     NC – 135ms     QRS – 82ms     QT- 366ms      AH- 82ms     HV- 54ms                         Ventricular Testing               VABCL- 300ms               RVAERP-  500/210ms            Atrial Testing                AVNBCL- 390ms                RAERP- 600/220ms            Notes                 - Dual AV nodes were noted.  Echo beats were seen. Atrial activation during VOD was concentric.    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   No other arrhythmias were inducible.         Ablation:            A His cloud was created using the [employer] 3D mapping system.  The ablation catheter was positioned along the tricuspid annulus immediately anterior to the coronary sinus ostium.  The AV ratio recording from the distal electrode pair was about 1:5.  Slow pathway potentials were seen.  RF applications were delivered at about site 8 (on the 12 point scale) and numerous junctionals were elicited.       Post Ablation Testing:            Baseline intervals including the HV interval after the final RF application were measured and noted to be unchanged from prior to ablation.  Isuprel (5mcg) was used to re-assess for any inducible arrhythmias and there were none inducible.  The AV wenkebach cycle length did prolong post procedure slightly.  Despite Isuprel up to 5mcg there were no inducible SVTs or AF after the ablation w/ programmed electrical stimulation.    Fluoro:  50 mGy    Complications:            None      Impression:  •	     Dual AV Nodes and Echo beats in a patient w/ clinical tachycardia.  •	     Successful slow pathway modification for treatment of this condition.     Recommendation(s):    1.	    Follow-up 2-4 weeks             George Dent MD, FHRS, FACC    of Cardiology  - [facility] [address] [address]  Electrophysiology Lab    EP Study and SVT Ablation  Electrophysiologic Study with catheter Ablation    Patient Information    Patient Name		Ty Cho  Study Date		2018  MRN			[id number]  			1985  Age			32yrs  Gender			Male  Electrophysiologist	George Dent MD      Procedure: Diagnostic EP Study and SVT Ablation  Indication: SVT  Anesthesia: As per anesthesiology note and 1% Lidocaine to each groin site  Methods:   The right and left groins were prepped with chorhexidine and then draped via sterile technique.  A 1% lidocaine solution was used to anesthetize each groin site.  Using the modified seldinger technique left and right femoral venous access was obtained and sheaths were placed (see below for further details).  Catheters were brought up to the heart and diagnostic EP testing was performed followed by SVT ablation (please see below for further detail).  At the completion of the procedure the catheters were removed the sheaths were removed and pressure was held and hemostasis obtained.  The patient was observed in the recovery bobo and was noted to be in stable condition.  Equipment:                Sheaths  Left Femoral vein – 5.5 fr Sheath, 5.5 fr Sheath, 5.5 fr Sheath  Right Femoral vein - 8 fr Sheath (8 fr SR0 long sheath exchanged), 7 fr sheath    Catheters            Jsn X2, CRD-2, deflectable decapolar, BioSense 4mm Ablation Catheter  Mapping            	 CARTO/Biosense - 3 D Mapping and conventional mapping     Findings:            Baseline Intervals               SCL – 775ms     TX – 135ms     QRS – 82ms     QT- 366ms      AH- 82ms     HV- 54ms                         Ventricular Testing               VABCL- 300ms               RVAERP-  500/210ms            Atrial Testing                AVNBCL- 390ms                RAERP- 600/220ms            Notes                 - Dual AV nodes were noted.  Echo beats were seen. Atrial activation during VOD was concentric.    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   No other arrhythmias were inducible.         Ablation:            A His cloud was created using the [employer] 3D mapping system.  The ablation catheter was positioned along the tricuspid annulus immediately anterior to the coronary sinus ostium.  The AV ratio recording from the distal electrode pair was about 1:5.  Slow pathway potentials were seen.  RF applications were delivered at about site 8 (on the 12 point scale) and numerous junctionals were elicited.       Post Ablation Testing:            Baseline intervals including the HV interval after the final RF application were measured and noted to be unchanged from prior to ablation.  Isuprel (5mcg) was used to re-assess for any inducible arrhythmias and there were none inducible.  The AV wenkebach cycle length did prolong post procedure slightly.  Despite Isuprel up to 5mcg there were no inducible SVTs or AF after the ablation w/ programmed electrical stimulation.    Fluoro:  50 mGy    Complications:            None      Impression:  •	     Dual AV Nodes and Echo beats in a patient w/ clinical tachycardia.  •	     Successful slow pathway modification for treatment of this condition.     Recommendation(s):    1.	    Follow-up 2-4 weeks             George Dent MD, FHRS, FACC    of Cardiology  - [facility]

## 2018-04-04 NOTE — DISCHARGE NOTE ADULT - CARE PLAN
Principal Discharge DX:	Palpitations  Goal:	minimize arrhythmia burden  Assessment and plan of treatment:	- Bruising at the groin, sometimes extending down the leg, and/or a small lump under the skin at the groin access site is normal and will resolve within 2 – 3 weeks.   - Occasional skipped beats or palpitations that last for a few beats are common and generally resolve within 1-2 months.   - You may walk and take stairs at a regular pace.   - Do not perform any exercise more strenuous than walking for 1 week.   - Do not strain or lift heavy objects for 1 week.  - You may shower the day after the procedure.  - Do not soak in water (such as tub baths, hot tubs, swimming, etc.) for 1 week.   - You may resume all other activities the day after the procedure.  Call your doctor if:   - you notice bleeding, redness, drainage, swelling, increased tenderness or a hot sensation around the catheter insertion site.   - your temperature is greater than 100 degrees F for more than 24 hours.  - your rapid heart rhythm returns.  - you have any questions or concerns regarding the procedure.  If significant bleeding and/or a large lump (the size of a golf ball or bigger) occurs:  - Lie flat and apply continuous direct pressure just above the puncture site for at least 10 minutes  - If the issue resolves, notify your physician immediately.    - If the bleeding cannot be controlled, please seek immediate medical attention.  If you experience increased difficulty breathing or chest pain, or if you faint or have dizzy spells, please seek immediate medical attention.

## 2018-04-04 NOTE — DISCHARGE NOTE ADULT - CARE PROVIDER_API CALL
George Dent), Cardiac Electrophysiology; Cardiovascular Disease; Internal Medicine  [address] [place] [address]  Phone: [phone]  Fax: [phone]

## 2018-04-04 NOTE — DISCHARGE NOTE ADULT - MEDICATION SUMMARY - MEDICATIONS TO TAKE
I will START or STAY ON the medications listed below when I get home from the hospital:    Fish Oil oral capsule  -- 1 cap(s) by mouth once a day  -- Indication: For Supplement    Vitamin D3 1000 intl units oral tablet  -- 1 tab(s) by mouth once a day  -- Indication: For Supplement

## 2018-04-04 NOTE — DISCHARGE NOTE ADULT - PATIENT PORTAL LINK FT
You can access the [facility], offered by [facility], by registering with the following website: [email or web address]

## 2018-04-04 NOTE — DISCHARGE NOTE ADULT - HOSPITAL COURSE
31 yo male with pmhx exercise induced asthma and a long standing history of palpitation s/p ILR implant 11/2016, [facility] and cryo AF ablation (PVI-1/[id number]), with infrequent episodes of recurrent, regular NCT >200bpm seen on ILR.  He presented electively 4/4/18 and underwent uncomplicated EPS, which demonstrated dual AV montserrat physiology with echo beats but no inducible tachycardia, and repeat slow pathway ablation. 31 yo male with pmhx exercise induced asthma and a long standing history of palpitation s/p ILR implant 11/2016, [facility] and cryo AF ablation (PVI-1/[id number]), with infrequent episodes of recurrent, regular NCT >200bpm seen on ILR.  He presented electively 4/4/18 and underwent uncomplicated EPS, which demonstrated dual AV montserrat physiology with echo beats but no inducible tachycardia, and repeat slow pathway ablation. The patient was observed per post procedure protocol and discharged home with a plan for outpatient follow up.

## 2018-05-03 ENCOUNTER — APPOINTMENT (OUTPATIENT)
Dept: ELECTROPHYSIOLOGY | Facility: CLINIC | Age: 33
End: 2018-05-03
Payer: COMMERCIAL

## 2018-05-03 PROCEDURE — 93298 REM INTERROG DEV EVAL SCRMS: CPT

## 2018-05-03 PROCEDURE — 93299: CPT

## 2018-06-03 ENCOUNTER — APPOINTMENT (OUTPATIENT)
Dept: ELECTROPHYSIOLOGY | Facility: CLINIC | Age: 33
End: 2018-06-03
Payer: COMMERCIAL

## 2018-06-03 PROCEDURE — 93298 REM INTERROG DEV EVAL SCRMS: CPT

## 2018-06-03 PROCEDURE — 93299: CPT

## 2018-12-05 PROBLEM — Z98.890 OTHER SPECIFIED POSTPROCEDURAL STATES: Chronic | Status: ACTIVE | Noted: 2018-03-30

## 2018-12-05 PROBLEM — I47.1 SUPRAVENTRICULAR TACHYCARDIA: Chronic | Status: ACTIVE | Noted: 2018-03-30

## 2018-12-10 ENCOUNTER — APPOINTMENT (OUTPATIENT)
Dept: ELECTROPHYSIOLOGY | Facility: CLINIC | Age: 33
End: 2018-12-10
Payer: COMMERCIAL

## 2018-12-10 VITALS
DIASTOLIC BLOOD PRESSURE: 86 MMHG | HEIGHT: 70 IN | WEIGHT: 215 LBS | HEART RATE: 84 BPM | SYSTOLIC BLOOD PRESSURE: 130 MMHG | BODY MASS INDEX: 30.78 KG/M2

## 2018-12-10 PROCEDURE — 93285 PRGRMG DEV EVAL SCRMS IP: CPT

## 2018-12-10 NOTE — REASON FOR VISIT
[Follow-up Device Check] : follow-up device check visit [Arrhythmias (seen on stored data)] : arrhythmias seen on stored data [Parent] : parent

## 2019-01-02 ENCOUNTER — APPOINTMENT (OUTPATIENT)
Dept: ELECTROPHYSIOLOGY | Facility: CLINIC | Age: 34
End: 2019-01-02
Payer: COMMERCIAL

## 2019-01-02 PROCEDURE — 93299: CPT

## 2019-01-02 PROCEDURE — 93298 REM INTERROG DEV EVAL SCRMS: CPT

## 2019-04-04 ENCOUNTER — APPOINTMENT (OUTPATIENT)
Dept: ELECTROPHYSIOLOGY | Facility: CLINIC | Age: 34
End: 2019-04-04
Payer: COMMERCIAL

## 2019-04-04 PROCEDURE — 93299: CPT

## 2019-04-04 PROCEDURE — 93298 REM INTERROG DEV EVAL SCRMS: CPT

## 2019-06-05 ENCOUNTER — APPOINTMENT (OUTPATIENT)
Dept: ELECTROPHYSIOLOGY | Facility: CLINIC | Age: 34
End: 2019-06-05
Payer: COMMERCIAL

## 2019-06-05 PROCEDURE — 93298 REM INTERROG DEV EVAL SCRMS: CPT

## 2019-06-05 PROCEDURE — 93299: CPT

## 2019-07-06 ENCOUNTER — APPOINTMENT (OUTPATIENT)
Dept: ELECTROPHYSIOLOGY | Facility: CLINIC | Age: 34
End: 2019-07-06
Payer: COMMERCIAL

## 2019-07-06 PROCEDURE — 93298 REM INTERROG DEV EVAL SCRMS: CPT

## 2019-07-06 PROCEDURE — 93299: CPT

## 2019-08-06 ENCOUNTER — APPOINTMENT (OUTPATIENT)
Dept: ELECTROPHYSIOLOGY | Facility: CLINIC | Age: 34
End: 2019-08-06
Payer: COMMERCIAL

## 2019-08-06 PROCEDURE — 93298 REM INTERROG DEV EVAL SCRMS: CPT

## 2019-08-06 PROCEDURE — 93299: CPT

## 2019-09-06 ENCOUNTER — APPOINTMENT (OUTPATIENT)
Dept: ELECTROPHYSIOLOGY | Facility: CLINIC | Age: 34
End: 2019-09-06
Payer: COMMERCIAL

## 2019-09-06 PROCEDURE — 93299: CPT

## 2019-09-06 PROCEDURE — 93298 REM INTERROG DEV EVAL SCRMS: CPT

## 2019-09-22 ENCOUNTER — EMERGENCY (EMERGENCY)
Facility: HOSPITAL | Age: 34
LOS: 1 days | Discharge: HOME | End: 2019-09-22
Attending: STUDENT IN AN ORGANIZED HEALTH CARE EDUCATION/TRAINING PROGRAM
Payer: COMMERCIAL

## 2019-09-22 VITALS
DIASTOLIC BLOOD PRESSURE: 94 MMHG | HEART RATE: 120 BPM | WEIGHT: 235.01 LBS | HEIGHT: 70 IN | SYSTOLIC BLOOD PRESSURE: 147 MMHG | TEMPERATURE: 98 F | RESPIRATION RATE: 18 BRPM

## 2019-09-22 VITALS
RESPIRATION RATE: 18 BRPM | SYSTOLIC BLOOD PRESSURE: 140 MMHG | OXYGEN SATURATION: 95 % | HEART RATE: 84 BPM | DIASTOLIC BLOOD PRESSURE: 82 MMHG

## 2019-09-22 DIAGNOSIS — I51.9 HEART DISEASE, UNSPECIFIED: Chronic | ICD-10-CM

## 2019-09-22 DIAGNOSIS — Z98.890 OTHER SPECIFIED POSTPROCEDURAL STATES: Chronic | ICD-10-CM

## 2019-09-22 LAB
ALBUMIN SERPL ELPH-MCNC: 4.5 G/DL — SIGNIFICANT CHANGE UP (ref 3.3–5.2)
ALP SERPL-CCNC: 70 U/L — SIGNIFICANT CHANGE UP (ref 40–120)
ALT FLD-CCNC: 27 U/L — SIGNIFICANT CHANGE UP
ANION GAP SERPL CALC-SCNC: 17 MMOL/L — SIGNIFICANT CHANGE UP (ref 5–17)
AST SERPL-CCNC: 22 U/L — SIGNIFICANT CHANGE UP
BILIRUB SERPL-MCNC: <0.2 MG/DL — LOW (ref 0.4–2)
BUN SERPL-MCNC: 18 MG/DL — SIGNIFICANT CHANGE UP (ref 8–20)
CALCIUM SERPL-MCNC: 9.1 MG/DL — SIGNIFICANT CHANGE UP (ref 8.6–10.2)
CHLORIDE SERPL-SCNC: 104 MMOL/L — SIGNIFICANT CHANGE UP (ref 98–107)
CO2 SERPL-SCNC: 20 MMOL/L — LOW (ref 22–29)
CREAT SERPL-MCNC: 0.85 MG/DL — SIGNIFICANT CHANGE UP (ref 0.5–1.3)
D DIMER BLD IA.RAPID-MCNC: <150 NG/ML DDU — SIGNIFICANT CHANGE UP
GLUCOSE SERPL-MCNC: 150 MG/DL — HIGH (ref 70–115)
HCT VFR BLD CALC: 45.2 % — SIGNIFICANT CHANGE UP (ref 39–50)
HGB BLD-MCNC: 15.2 G/DL — SIGNIFICANT CHANGE UP (ref 13–17)
MAGNESIUM SERPL-MCNC: 1.9 MG/DL — SIGNIFICANT CHANGE UP (ref 1.8–2.6)
MCHC RBC-ENTMCNC: 29.3 PG — SIGNIFICANT CHANGE UP (ref 27–34)
MCHC RBC-ENTMCNC: 33.6 GM/DL — SIGNIFICANT CHANGE UP (ref 32–36)
MCV RBC AUTO: 87.3 FL — SIGNIFICANT CHANGE UP (ref 80–100)
PLATELET # BLD AUTO: 179 K/UL — SIGNIFICANT CHANGE UP (ref 150–400)
POTASSIUM SERPL-MCNC: 3.6 MMOL/L — SIGNIFICANT CHANGE UP (ref 3.5–5.3)
POTASSIUM SERPL-SCNC: 3.6 MMOL/L — SIGNIFICANT CHANGE UP (ref 3.5–5.3)
PROT SERPL-MCNC: 7.2 G/DL — SIGNIFICANT CHANGE UP (ref 6.6–8.7)
RBC # BLD: 5.18 M/UL — SIGNIFICANT CHANGE UP (ref 4.2–5.8)
RBC # FLD: 12.3 % — SIGNIFICANT CHANGE UP (ref 10.3–14.5)
SODIUM SERPL-SCNC: 141 MMOL/L — SIGNIFICANT CHANGE UP (ref 135–145)
TSH SERPL-MCNC: 3.77 UIU/ML — SIGNIFICANT CHANGE UP (ref 0.27–4.2)
WBC # BLD: 6.86 K/UL — SIGNIFICANT CHANGE UP (ref 3.8–10.5)
WBC # FLD AUTO: 6.86 K/UL — SIGNIFICANT CHANGE UP (ref 3.8–10.5)

## 2019-09-22 PROCEDURE — 99284 EMERGENCY DEPT VISIT MOD MDM: CPT

## 2019-09-22 PROCEDURE — 80053 COMPREHEN METABOLIC PANEL: CPT

## 2019-09-22 PROCEDURE — 93010 ELECTROCARDIOGRAM REPORT: CPT

## 2019-09-22 PROCEDURE — 93005 ELECTROCARDIOGRAM TRACING: CPT

## 2019-09-22 PROCEDURE — 83735 ASSAY OF MAGNESIUM: CPT

## 2019-09-22 PROCEDURE — 36415 COLL VENOUS BLD VENIPUNCTURE: CPT

## 2019-09-22 PROCEDURE — 71046 X-RAY EXAM CHEST 2 VIEWS: CPT | Mod: 26

## 2019-09-22 PROCEDURE — 71046 X-RAY EXAM CHEST 2 VIEWS: CPT

## 2019-09-22 PROCEDURE — 99284 EMERGENCY DEPT VISIT MOD MDM: CPT | Mod: 25

## 2019-09-22 PROCEDURE — 85379 FIBRIN DEGRADATION QUANT: CPT

## 2019-09-22 PROCEDURE — 85027 COMPLETE CBC AUTOMATED: CPT

## 2019-09-22 PROCEDURE — 84443 ASSAY THYROID STIM HORMONE: CPT

## 2019-09-22 RX ORDER — SODIUM CHLORIDE 9 MG/ML
2000 INJECTION INTRAMUSCULAR; INTRAVENOUS; SUBCUTANEOUS ONCE
Refills: 0 | Status: COMPLETED | OUTPATIENT
Start: 2019-09-22 | End: 2019-09-22

## 2019-09-22 RX ADMIN — SODIUM CHLORIDE 2000 MILLILITER(S): 9 INJECTION INTRAMUSCULAR; INTRAVENOUS; SUBCUTANEOUS at 05:26

## 2019-09-22 NOTE — ED PROVIDER NOTE - PATIENT PORTAL LINK FT
You can access the FollowMyHealth Patient Portal offered by [facility] by registering at the following website: [email or web address]. By joining [facility] FollowMyHealth portal, you will also be able to view your health information using other applications (apps) compatible with our system.

## 2019-09-22 NOTE — ED PROVIDER NOTE - OBJECTIVE STATEMENT
34 yo patient male with history of SVT and afib presenting for evaluation of acute palpitation that woke him up form sleep tonight. Patient states he has been generally well without any complaints. Patient denies fever, chills, nausea, viral illness, recent trauma but recetly returned from trip to [place] couple of week ago.

## 2019-09-22 NOTE — ED PROVIDER NOTE - CLINICAL SUMMARY MEDICAL DECISION MAKING FREE TEXT BOX
Patient with sinus tachycardia dueto unclear cause. It has now resolved. Patient stable to follow-up with cardiology out-patient.

## 2019-09-22 NOTE — ED PROVIDER NOTE - PMH
Asthma    AVNRT (AV montserrat re-entry tachycardia)  s/p slow path mod 5/2016 -[facility]  History of loop recorder  11/2016 -[facility]  Palpitations    Paroxysmal a-fib  s/p ablation [id number]

## 2019-09-22 NOTE — ED PROVIDER NOTE - PROGRESS NOTE DETAILS
Heart at 84. Patient resting comfortably. Heart at 84. Patient resting comfortably. Loop recorder interrogated.

## 2019-09-22 NOTE — ED ADULT NURSE NOTE - OBJECTIVE STATEMENT
pt a&ox3 resting in stretcher. cardiac monitor in place. nsr on monitor. wife at bedside. pt reports getting up in the middle of the night to use the restroom and he felt his heart racing as he was walking back to bed. pt has hx of a fib and palpitations so he came to ed for eval.

## 2019-09-22 NOTE — ED ADULT NURSE NOTE - PMH
Asthma    AVNRT (AV montserrat re-entry tachycardia)  s/p slow path mod 5/2016 -[facility]  History of loop recorder  11/2016 -[facility]  Palpitations    Paroxysmal a-fib  s/p ablation 1/[id number]

## 2019-09-22 NOTE — ED ADULT TRIAGE NOTE - CHIEF COMPLAINT QUOTE
patient states that he has HX of A-fib, got up to get a drink of water came back and heart was racing and felt dizzy/ lightheaded

## 2019-09-23 ENCOUNTER — TRANSCRIPTION ENCOUNTER (OUTPATIENT)
Age: 34
End: 2019-09-23

## 2019-10-07 ENCOUNTER — APPOINTMENT (OUTPATIENT)
Dept: ELECTROPHYSIOLOGY | Facility: CLINIC | Age: 34
End: 2019-10-07
Payer: COMMERCIAL

## 2019-10-07 PROCEDURE — 93299: CPT

## 2019-10-07 PROCEDURE — 93298 REM INTERROG DEV EVAL SCRMS: CPT

## 2019-11-07 ENCOUNTER — APPOINTMENT (OUTPATIENT)
Dept: ELECTROPHYSIOLOGY | Facility: CLINIC | Age: 34
End: 2019-11-07
Payer: COMMERCIAL

## 2019-11-07 DIAGNOSIS — R42 DIZZINESS AND GIDDINESS: ICD-10-CM

## 2019-11-07 PROCEDURE — 93299: CPT

## 2019-11-07 PROCEDURE — 93298 REM INTERROG DEV EVAL SCRMS: CPT

## 2019-11-12 ENCOUNTER — APPOINTMENT (OUTPATIENT)
Dept: ELECTROPHYSIOLOGY | Facility: CLINIC | Age: 34
End: 2019-11-12
Payer: COMMERCIAL

## 2019-11-12 VITALS
WEIGHT: 245 LBS | SYSTOLIC BLOOD PRESSURE: 148 MMHG | OXYGEN SATURATION: 99 % | HEART RATE: 93 BPM | BODY MASS INDEX: 35.07 KG/M2 | DIASTOLIC BLOOD PRESSURE: 100 MMHG | HEIGHT: 70 IN

## 2019-11-12 PROCEDURE — 93291 INTERROG DEV EVAL SCRMS IP: CPT

## 2019-11-12 PROCEDURE — 99214 OFFICE O/P EST MOD 30 MIN: CPT

## 2019-11-29 NOTE — HISTORY OF PRESENT ILLNESS
[FreeTextEntry1] : This is a 34 year old man with a history of SVT and Atrial fibrillation. On May 25, 2016 he underwent diagnostic electrophysiologic testing and was found to have inducible typical AV node reentrant tachycardia and underwent slow pathway ablation. In November 2016, Implantable Cardiac Monitor (LINQ) placed. Found to have atrial fibrillation recurrently and underwent age for fibrillation in January 2017.  He has tachycardia that was limited to exertion in past. He now presents with a symptomatic episode of tachycardia on Linq.

## 2019-11-29 NOTE — ASSESSMENT
[FreeTextEntry1] : This is a 34 year old man with a history of AVNRT s/p ablation, PAF s/p PVI who presents with a narrow complex tachycardia. ECG suggestive of AVNRT (Short RP)

## 2019-11-29 NOTE — DISCUSSION/SUMMARY
[SVT] : SVT [Stable] : stable [None] : none [Catheter Ablation] : catheter ablation [Patient] : the patient

## 2019-11-29 NOTE — PHYSICAL EXAM
[General Appearance - Well Developed] : well developed [General Appearance - Well Nourished] : well nourished [Well Groomed] : well groomed [Normal Appearance] : normal appearance [No Deformities] : no deformities [General Appearance - In No Acute Distress] : no acute distress [Normal Conjunctiva] : the conjunctiva exhibited no abnormalities [Eyelids - No Xanthelasma] : the eyelids demonstrated no xanthelasmas [Normal Oral Mucosa] : normal oral mucosa [No Oral Cyanosis] : no oral cyanosis [No Oral Pallor] : no oral pallor [Normal Jugular Venous A Waves Present] : normal jugular venous A waves present [No Jugular Venous Biswas A Waves] : no jugular venous biswas A waves [Normal Jugular Venous V Waves Present] : normal jugular venous V waves present [Respiration, Rhythm And Depth] : normal respiratory rhythm and effort [Exaggerated Use Of Accessory Muscles For Inspiration] : no accessory muscle use [Heart Sounds] : normal S1 and S2 [Heart Rate And Rhythm] : heart rate and rhythm were normal [Auscultation Breath Sounds / Voice Sounds] : lungs were clear to auscultation bilaterally [Murmurs] : no murmurs present [Abdomen Soft] : soft [Abdomen Tenderness] : non-tender [Abnormal Walk] : normal gait [Abdomen Mass (___ Cm)] : no abdominal mass palpated [Nail Clubbing] : no clubbing of the fingernails [Gait - Sufficient For Exercise Testing] : the gait was sufficient for exercise testing [Petechial Hemorrhages (___cm)] : no petechial hemorrhages [Cyanosis, Localized] : no localized cyanosis [Skin Color & Pigmentation] : normal skin color and pigmentation [] : no rash [No Venous Stasis] : no venous stasis [No Skin Ulcers] : no skin ulcer [No Xanthoma] : no  xanthoma was observed [Skin Lesions] : no skin lesions [Oriented To Time, Place, And Person] : oriented to person, place, and time [Affect] : the affect was normal [Mood] : the mood was normal [No Anxiety] : not feeling anxious

## 2019-12-11 ENCOUNTER — APPOINTMENT (OUTPATIENT)
Dept: ELECTROPHYSIOLOGY | Facility: CLINIC | Age: 34
End: 2019-12-11
Payer: SELF-PAY

## 2019-12-11 PROCEDURE — 93298 REM INTERROG DEV EVAL SCRMS: CPT

## 2019-12-11 PROCEDURE — 93299: CPT

## 2020-01-13 ENCOUNTER — APPOINTMENT (OUTPATIENT)
Dept: ELECTROPHYSIOLOGY | Facility: CLINIC | Age: 35
End: 2020-01-13
Payer: SELF-PAY

## 2020-01-13 PROCEDURE — G2066: CPT

## 2020-01-13 PROCEDURE — 93298 REM INTERROG DEV EVAL SCRMS: CPT

## 2020-02-13 ENCOUNTER — APPOINTMENT (OUTPATIENT)
Dept: ELECTROPHYSIOLOGY | Facility: CLINIC | Age: 35
End: 2020-02-13
Payer: SELF-PAY

## 2020-02-14 PROCEDURE — 93298 REM INTERROG DEV EVAL SCRMS: CPT

## 2020-02-14 PROCEDURE — G2066: CPT

## 2020-03-17 ENCOUNTER — APPOINTMENT (OUTPATIENT)
Dept: ELECTROPHYSIOLOGY | Facility: CLINIC | Age: 35
End: 2020-03-17
Payer: COMMERCIAL

## 2020-03-17 PROCEDURE — G2066: CPT

## 2020-03-17 PROCEDURE — 93298 REM INTERROG DEV EVAL SCRMS: CPT

## 2020-04-17 ENCOUNTER — APPOINTMENT (OUTPATIENT)
Dept: ELECTROPHYSIOLOGY | Facility: CLINIC | Age: 35
End: 2020-04-17
Payer: COMMERCIAL

## 2020-04-17 PROCEDURE — 93298 REM INTERROG DEV EVAL SCRMS: CPT

## 2020-04-17 PROCEDURE — G2066: CPT

## 2020-05-19 ENCOUNTER — APPOINTMENT (OUTPATIENT)
Dept: ELECTROPHYSIOLOGY | Facility: CLINIC | Age: 35
End: 2020-05-19
Payer: COMMERCIAL

## 2020-05-19 PROCEDURE — 93298 REM INTERROG DEV EVAL SCRMS: CPT

## 2020-05-19 PROCEDURE — G2066: CPT

## 2020-06-19 ENCOUNTER — APPOINTMENT (OUTPATIENT)
Dept: ELECTROPHYSIOLOGY | Facility: CLINIC | Age: 35
End: 2020-06-19
Payer: COMMERCIAL

## 2020-06-19 PROCEDURE — G2066: CPT

## 2020-06-19 PROCEDURE — 93298 REM INTERROG DEV EVAL SCRMS: CPT

## 2020-07-17 ENCOUNTER — APPOINTMENT (OUTPATIENT)
Dept: ELECTROPHYSIOLOGY | Facility: CLINIC | Age: 35
End: 2020-07-17

## 2020-07-24 ENCOUNTER — APPOINTMENT (OUTPATIENT)
Dept: ELECTROPHYSIOLOGY | Facility: CLINIC | Age: 35
End: 2020-07-24

## 2020-08-04 ENCOUNTER — APPOINTMENT (OUTPATIENT)
Dept: ELECTROPHYSIOLOGY | Facility: CLINIC | Age: 35
End: 2020-08-04
Payer: COMMERCIAL

## 2020-08-04 ENCOUNTER — NON-APPOINTMENT (OUTPATIENT)
Age: 35
End: 2020-08-04

## 2020-08-04 VITALS
HEART RATE: 87 BPM | BODY MASS INDEX: 34.36 KG/M2 | WEIGHT: 240 LBS | HEIGHT: 70 IN | DIASTOLIC BLOOD PRESSURE: 87 MMHG | SYSTOLIC BLOOD PRESSURE: 152 MMHG | OXYGEN SATURATION: 98 %

## 2020-08-04 DIAGNOSIS — Z95.818 PRESENCE OF OTHER CARDIAC IMPLANTS AND GRAFTS: ICD-10-CM

## 2020-08-04 PROCEDURE — 99214 OFFICE O/P EST MOD 30 MIN: CPT

## 2020-08-04 PROCEDURE — 93000 ELECTROCARDIOGRAM COMPLETE: CPT | Mod: 59

## 2020-08-04 PROCEDURE — 93285 PRGRMG DEV EVAL SCRMS IP: CPT

## 2020-08-12 PROBLEM — Z95.818 PRESENCE OF CARDIAC DEVICE: Status: ACTIVE | Noted: 2017-04-06

## 2020-08-12 NOTE — ASSESSMENT
[FreeTextEntry1] : This is a 34 year old man with a history of AVNRT s/p ablation, PAF s/p PVI who presents with an ILR at end of service. He has had no recent arrhytjmia and requests explant.

## 2020-08-12 NOTE — PHYSICAL EXAM
[General Appearance - Well Developed] : well developed [Normal Appearance] : normal appearance [Well Groomed] : well groomed [General Appearance - Well Nourished] : well nourished [General Appearance - In No Acute Distress] : no acute distress [No Deformities] : no deformities [Eyelids - No Xanthelasma] : the eyelids demonstrated no xanthelasmas [Normal Conjunctiva] : the conjunctiva exhibited no abnormalities [No Oral Pallor] : no oral pallor [Normal Oral Mucosa] : normal oral mucosa [No Oral Cyanosis] : no oral cyanosis [Normal Jugular Venous A Waves Present] : normal jugular venous A waves present [Normal Jugular Venous V Waves Present] : normal jugular venous V waves present [No Jugular Venous Biswas A Waves] : no jugular venous biswas A waves [Respiration, Rhythm And Depth] : normal respiratory rhythm and effort [Auscultation Breath Sounds / Voice Sounds] : lungs were clear to auscultation bilaterally [Exaggerated Use Of Accessory Muscles For Inspiration] : no accessory muscle use [Heart Rate And Rhythm] : heart rate and rhythm were normal [Heart Sounds] : normal S1 and S2 [Murmurs] : no murmurs present [Abdomen Soft] : soft [Abdomen Tenderness] : non-tender [Abdomen Mass (___ Cm)] : no abdominal mass palpated [Abnormal Walk] : normal gait [Gait - Sufficient For Exercise Testing] : the gait was sufficient for exercise testing [Nail Clubbing] : no clubbing of the fingernails [Petechial Hemorrhages (___cm)] : no petechial hemorrhages [Cyanosis, Localized] : no localized cyanosis [Skin Color & Pigmentation] : normal skin color and pigmentation [No Venous Stasis] : no venous stasis [] : no rash [Skin Lesions] : no skin lesions [No Skin Ulcers] : no skin ulcer [No Xanthoma] : no  xanthoma was observed [Oriented To Time, Place, And Person] : oriented to person, place, and time [Affect] : the affect was normal [Mood] : the mood was normal [No Anxiety] : not feeling anxious

## 2020-08-12 NOTE — HISTORY OF PRESENT ILLNESS
[FreeTextEntry1] : This is a 34 year old man with a history of SVT and Atrial fibrillation. On May 25, 2016 he underwent diagnostic electrophysiologic testing and was found to have inducible typical AV node reentrant tachycardia and underwent slow pathway ablation. In November 2016, Implantable Cardiac Monitor (LINQ) placed. Th3e linq is now end of service. \par \par CARA TAYLOR  denies chest pain, chest pressure, shortness of breath, lightheadedness, dizziness, palpitations, syncope, presyncope, orthopnea, PND, or edema.

## 2020-08-23 ENCOUNTER — APPOINTMENT (OUTPATIENT)
Dept: DISASTER EMERGENCY | Facility: CLINIC | Age: 35
End: 2020-08-23

## 2020-08-24 LAB — SARS-COV-2 N GENE NPH QL NAA+PROBE: NOT DETECTED

## 2020-08-25 NOTE — ASU PATIENT PROFILE, ADULT - PMH
Asthma    AVNRT (AV montserrat re-entry tachycardia)  s/p slow path mod 5/2016 -[facility]  History of loop recorder  11/2016 -[facility]  Palpitations    Paroxysmal a-fib  s/p ablation 1/[id number]

## 2020-08-25 NOTE — ASU PATIENT PROFILE, ADULT - PSH
Cardiac disorder  [email or web address] Loop Linq  mplantable loop recorder Nov. 2016  H/O atrioventricular montserrat ablation  SVT ablation May 2016

## 2020-08-25 NOTE — ASU PATIENT PROFILE, ADULT - SUPPORT PERSON NAME
How Severe Is It?: mild Is This A New Presentation, Or A Follow-Up?: Discoloration [place] - wife

## 2020-08-25 NOTE — ASU PATIENT PROFILE, ADULT - NPO AFTER
.  .                                                      At [facility], one important tool we use to improve our patient services is our Patient Survey.  Following your visit you may receive our survey in the mail or by email.    Please take the time to complete the survey.    If your visit with us was great, we want to hear about it.    If we can improve, please let us know how.           
00:00

## 2020-08-26 ENCOUNTER — OUTPATIENT (OUTPATIENT)
Dept: OUTPATIENT SERVICES | Facility: HOSPITAL | Age: 35
LOS: 1 days | Discharge: HOME | End: 2020-08-26
Payer: COMMERCIAL

## 2020-08-26 VITALS
DIASTOLIC BLOOD PRESSURE: 87 MMHG | OXYGEN SATURATION: 100 % | SYSTOLIC BLOOD PRESSURE: 141 MMHG | RESPIRATION RATE: 18 BRPM | HEART RATE: 79 BPM | TEMPERATURE: 97 F

## 2020-08-26 VITALS
HEART RATE: 80 BPM | OXYGEN SATURATION: 100 % | WEIGHT: 240.08 LBS | TEMPERATURE: 97 F | SYSTOLIC BLOOD PRESSURE: 136 MMHG | DIASTOLIC BLOOD PRESSURE: 86 MMHG | RESPIRATION RATE: 17 BRPM | HEIGHT: 70 IN

## 2020-08-26 DIAGNOSIS — Z98.890 OTHER SPECIFIED POSTPROCEDURAL STATES: Chronic | ICD-10-CM

## 2020-08-26 DIAGNOSIS — I48.91 UNSPECIFIED ATRIAL FIBRILLATION: ICD-10-CM

## 2020-08-26 DIAGNOSIS — I51.9 HEART DISEASE, UNSPECIFIED: Chronic | ICD-10-CM

## 2020-08-26 PROCEDURE — 33286 RMVL SUBQ CAR RHYTHM MNTR: CPT

## 2020-08-26 RX ORDER — OMEGA-3 ACID ETHYL ESTERS 1 G
1 CAPSULE ORAL
Qty: 0 | Refills: 0 | DISCHARGE

## 2020-08-26 RX ORDER — CHOLECALCIFEROL (VITAMIN D3) 125 MCG
1 CAPSULE ORAL
Qty: 0 | Refills: 0 | DISCHARGE

## 2020-08-26 NOTE — PROCEDURAL SAFETY CHECKLIST WITH OR WITHOUT SEDATION - NSPREEQUIPSUP_GEN_ALL_CORE
Physical Therapy Daily Treatment  ORTHOTIC CASTING      \"Yoana\"  Visit Count: 6 OF 10 total  Plan of Care Dates: Initial: 5/23/2017 Through: 10 visits  Insurance Information:THERAPY BENEFITS:  PAYOR: HUMANA  VISIT LIMIT: 120 VISITS PER CALENDAR YEAR COMBINED PT/OT/ST/CT/AT  AUTHORIZATION NEEDED: NO  ORTHOTICS: 02685, ,  - VALID & BILLABLE. COVERED AT 90%. NO AUTHORIZATION REQUIRED.  DEDUCTIBLE: $250.00        MET: $250.00  OUT OF POCKET: $1500.00        MET: $265.34  COINSURANCE: 10%  COPAY: $0     Next Referring Provider Visit: prn    Referred by: Alena Bray DPM  Medical Diagnosis (from order): Q66.51 Pes planus, congenital, right  M79.671 Right foot pain  M21.40 PTTD (posterior tibial tendon dysfunction)   Treatment Diagnosis: Ankle/Foot Symptoms with Pain, Impaired Posture, Impaired Joint Mobility and Impaired Range of Motion  Insurance: 1. HUMANA  2. N/A    Date of Onset: 2-3 weeks ago   Diagnosis Precautions: none  Relevant co-morbidities and medications:Diabetes since '89 and w/ insulin pump for the last 6 yrs(good management of diabetes); back pain hx w/ work injury w/ disc herniation in 2008(one injection shortly after onset; back pain daily since the injury and w/ intermittent burning pain to the R medial knee), can have 2 more injections if needed but doesn't feel needed at this time. )  ; Dr Bray advised custom orthotics and PT.   Relevant Tests: xray: R foot:IMPRESSION:  No evidence for fracture or dislocation of the foot.        SUBJECTIVE    '' With the tape my feet feel so much better.''    OBJECTIVE    visit 4: df to 7 degrees initially and to 10 after belt stretch and to 12 (and to 15 at 90 degrees of knee) after MT.    Info from Visit 3: Pt demonstrates bilateral forefoot varus uncompensated with resultant posterior tib tendonitis.    Treatment        NO EXERCISES 6-14-17 do to orthotic casting this date  Therapeutic Exercises Details Comments HEP(x) Performed this date(x)    gastroc stretching W/ belt stretch 3 x 30 ; educated w/ stair stretch Good technique/advised for both x x   F/b 10 reps of df/ NMREED X 10 Df in standing x x   Standing TA/ GS/ isometric hip ER Reviewed for lift/ support of the arch to unload PTT T/o the day x x   1/2 foam roller X 20 each way  x x   gastroc and soleus stretch  Each 3 x 30 sec Phonebook(as is doing at home) x x   Discussed icing and shoes X 5 min      Heel raises 2 x 10 reps;  To work toward 3 sets of 10  x x   education Regarding potential night splint option if needed.                              OBJECTIVE  Casted for custom orthotics this date. Pt demonstrates and uncompensated forefoot varus and PF first rays bilaterally. Casted with plaster casting with good pt tolerance.    ASSESSMENT   Instructed pt that orthotics will arrive in 4 to 5 weeks and an appointment for  and explanation of care for orthotics and break in schedule for orthotics will be given to pt at that time.     Verbalizes understanding and Demonstrates understanding    Goals:  To be obtained by end of this plan of care:  1. Subjective improvement by 60% or better w/ regards to  Decrease in pain and increase in function  2. Df to 12 degrees  3. MMT inv R 5/5  4. Independent HEP to include stretching regularly and strengthening to facilitate above goals  5. Above goals to allow for alo to 30 mins at a time standing w / equal wbing for household and work tasks; walking 4 block w/ good alo for overall health  6 . Custom orthotics fabrication if pt agrees/ deemed appropriate to facilitate above goals.   7. LEFS score of 62/80 for overall increased quality of life.     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     THERAPY DAILY BILLING   Primary Insurance: HUMANA  Secondary Insurance: N/A      Evaluation Procedures:  No evaluation codes were used on this date of service    Timed Procedures:  Orthotics Training, 45 minutes    Untimed Procedures:   [employer] custom foot orthotic; foot insert removable mold to model      Total Treatment Time: 45 minutes   done

## 2020-08-26 NOTE — PACU DISCHARGE NOTE - COMMENTS
Received permission for treatment of patient from Mom who is deployed over seas in [place].  Added to this note and also scanned into patient's chart.       To whom it may concern,                                                                                             12 JAN 2017    I, Nohemi Rogers, give permission for [facility] to treat my son, Suleiman Rhodes.  This is a referral appointment for Suleiman to follow up on a medical issue he has been experiencing. I am  currently deployed with the [place] [employer] and unable to attend the appointment with my son.  During his appointment his emergency contact will be Lauri Rogers (step-father) [phone], at  [address] [place] [id number] or Nohemi Rogers via email [email or web address] or text [phone].  We are covered under .    Sincerely,    1/12/2017    _______________  Nohemi Rogers  Signed by: [id number]  
Discharge instructions given to patient. Answered all questions, verbalized understanding. Wound dressing clean dry and intact. Denies pain or discomfort. Left the unit at 1105 to meet Scoo, wife in the lobby

## 2020-08-27 DIAGNOSIS — J45.909 UNSPECIFIED ASTHMA, UNCOMPLICATED: ICD-10-CM

## 2020-08-27 DIAGNOSIS — Z45.09 ENCOUNTER FOR ADJUSTMENT AND MANAGEMENT OF OTHER CARDIAC DEVICE: ICD-10-CM

## 2020-08-27 DIAGNOSIS — I48.0 PAROXYSMAL ATRIAL FIBRILLATION: ICD-10-CM

## 2020-08-27 DIAGNOSIS — I47.1 SUPRAVENTRICULAR TACHYCARDIA: ICD-10-CM

## 2020-08-27 DIAGNOSIS — R00.2 PALPITATIONS: ICD-10-CM

## 2020-09-16 ENCOUNTER — APPOINTMENT (OUTPATIENT)
Dept: ELECTROPHYSIOLOGY | Facility: CLINIC | Age: 35
End: 2020-09-16

## 2021-08-02 ENCOUNTER — EMERGENCY (EMERGENCY)
Facility: HOSPITAL | Age: 36
LOS: 1 days | Discharge: HOME | End: 2021-08-02
Attending: STUDENT IN AN ORGANIZED HEALTH CARE EDUCATION/TRAINING PROGRAM
Payer: COMMERCIAL

## 2021-08-02 VITALS
OXYGEN SATURATION: 95 % | RESPIRATION RATE: 16 BRPM | HEIGHT: 70 IN | SYSTOLIC BLOOD PRESSURE: 141 MMHG | TEMPERATURE: 98 F | WEIGHT: 190.04 LBS | DIASTOLIC BLOOD PRESSURE: 90 MMHG | HEART RATE: 100 BPM

## 2021-08-02 DIAGNOSIS — I51.9 HEART DISEASE, UNSPECIFIED: Chronic | ICD-10-CM

## 2021-08-02 DIAGNOSIS — Z98.890 OTHER SPECIFIED POSTPROCEDURAL STATES: Chronic | ICD-10-CM

## 2021-08-02 LAB
ALBUMIN SERPL ELPH-MCNC: 4.3 G/DL — SIGNIFICANT CHANGE UP (ref 3.3–5.2)
ALP SERPL-CCNC: 62 U/L — SIGNIFICANT CHANGE UP (ref 40–120)
ALT FLD-CCNC: 21 U/L — SIGNIFICANT CHANGE UP
ANION GAP SERPL CALC-SCNC: 9 MMOL/L — SIGNIFICANT CHANGE UP (ref 5–17)
APPEARANCE UR: ABNORMAL
AST SERPL-CCNC: 17 U/L — SIGNIFICANT CHANGE UP
BACTERIA # UR AUTO: NEGATIVE — SIGNIFICANT CHANGE UP
BASOPHILS # BLD AUTO: 0 K/UL — SIGNIFICANT CHANGE UP (ref 0–0.2)
BASOPHILS NFR BLD AUTO: 0 % — SIGNIFICANT CHANGE UP (ref 0–2)
BILIRUB SERPL-MCNC: 0.7 MG/DL — SIGNIFICANT CHANGE UP (ref 0.4–2)
BILIRUB UR-MCNC: NEGATIVE — SIGNIFICANT CHANGE UP
BUN SERPL-MCNC: 18.6 MG/DL — SIGNIFICANT CHANGE UP (ref 8–20)
CALCIUM SERPL-MCNC: 8.7 MG/DL — SIGNIFICANT CHANGE UP (ref 8.6–10.2)
CHLORIDE SERPL-SCNC: 103 MMOL/L — SIGNIFICANT CHANGE UP (ref 98–107)
CO2 SERPL-SCNC: 25 MMOL/L — SIGNIFICANT CHANGE UP (ref 22–29)
COLOR SPEC: YELLOW — SIGNIFICANT CHANGE UP
CREAT SERPL-MCNC: 1.03 MG/DL — SIGNIFICANT CHANGE UP (ref 0.5–1.3)
DIFF PNL FLD: NEGATIVE — SIGNIFICANT CHANGE UP
EOSINOPHIL # BLD AUTO: 0 K/UL — SIGNIFICANT CHANGE UP (ref 0–0.5)
EOSINOPHIL NFR BLD AUTO: 0 % — SIGNIFICANT CHANGE UP (ref 0–6)
EPI CELLS # UR: SIGNIFICANT CHANGE UP
GIANT PLATELETS BLD QL SMEAR: PRESENT — SIGNIFICANT CHANGE UP
GLUCOSE SERPL-MCNC: 148 MG/DL — HIGH (ref 70–99)
GLUCOSE UR QL: NEGATIVE MG/DL — SIGNIFICANT CHANGE UP
HCT VFR BLD CALC: 47.3 % — SIGNIFICANT CHANGE UP (ref 39–50)
HGB BLD-MCNC: 15.7 G/DL — SIGNIFICANT CHANGE UP (ref 13–17)
KETONES UR-MCNC: NEGATIVE — SIGNIFICANT CHANGE UP
LEUKOCYTE ESTERASE UR-ACNC: NEGATIVE — SIGNIFICANT CHANGE UP
LIDOCAIN IGE QN: 21 U/L — LOW (ref 22–51)
LYMPHOCYTES # BLD AUTO: 0.16 K/UL — LOW (ref 1–3.3)
LYMPHOCYTES # BLD AUTO: 1.8 % — LOW (ref 13–44)
MAGNESIUM SERPL-MCNC: 1.6 MG/DL — SIGNIFICANT CHANGE UP (ref 1.6–2.6)
MANUAL SMEAR VERIFICATION: SIGNIFICANT CHANGE UP
MCHC RBC-ENTMCNC: 29.4 PG — SIGNIFICANT CHANGE UP (ref 27–34)
MCHC RBC-ENTMCNC: 33.2 GM/DL — SIGNIFICANT CHANGE UP (ref 32–36)
MCV RBC AUTO: 88.6 FL — SIGNIFICANT CHANGE UP (ref 80–100)
MONOCYTES # BLD AUTO: 0.08 K/UL — SIGNIFICANT CHANGE UP (ref 0–0.9)
MONOCYTES NFR BLD AUTO: 0.9 % — LOW (ref 2–14)
NEUTROPHILS # BLD AUTO: 8.38 K/UL — HIGH (ref 1.8–7.4)
NEUTROPHILS NFR BLD AUTO: 86.8 % — HIGH (ref 43–77)
NEUTS BAND # BLD: 9.6 % — HIGH (ref 0–8)
NITRITE UR-MCNC: NEGATIVE — SIGNIFICANT CHANGE UP
OVALOCYTES BLD QL SMEAR: SLIGHT — SIGNIFICANT CHANGE UP
PH UR: 5 — SIGNIFICANT CHANGE UP (ref 5–8)
PLAT MORPH BLD: NORMAL — SIGNIFICANT CHANGE UP
PLATELET # BLD AUTO: 124 K/UL — LOW (ref 150–400)
POIKILOCYTOSIS BLD QL AUTO: SLIGHT — SIGNIFICANT CHANGE UP
POLYCHROMASIA BLD QL SMEAR: SLIGHT — SIGNIFICANT CHANGE UP
POTASSIUM SERPL-MCNC: 4.4 MMOL/L — SIGNIFICANT CHANGE UP (ref 3.5–5.3)
POTASSIUM SERPL-SCNC: 4.4 MMOL/L — SIGNIFICANT CHANGE UP (ref 3.5–5.3)
PROT SERPL-MCNC: 6.7 G/DL — SIGNIFICANT CHANGE UP (ref 6.6–8.7)
PROT UR-MCNC: 15 MG/DL
RBC # BLD: 5.34 M/UL — SIGNIFICANT CHANGE UP (ref 4.2–5.8)
RBC # FLD: 13.1 % — SIGNIFICANT CHANGE UP (ref 10.3–14.5)
RBC BLD AUTO: ABNORMAL
RBC CASTS # UR COMP ASSIST: NEGATIVE /HPF — SIGNIFICANT CHANGE UP (ref 0–4)
SODIUM SERPL-SCNC: 137 MMOL/L — SIGNIFICANT CHANGE UP (ref 135–145)
SP GR SPEC: 1.01 — SIGNIFICANT CHANGE UP (ref 1.01–1.02)
UROBILINOGEN FLD QL: NEGATIVE MG/DL — SIGNIFICANT CHANGE UP
VARIANT LYMPHS # BLD: 0.9 % — SIGNIFICANT CHANGE UP (ref 0–6)
WBC # BLD: 8.69 K/UL — SIGNIFICANT CHANGE UP (ref 3.8–10.5)
WBC # FLD AUTO: 8.69 K/UL — SIGNIFICANT CHANGE UP (ref 3.8–10.5)
WBC UR QL: SIGNIFICANT CHANGE UP

## 2021-08-02 PROCEDURE — 99284 EMERGENCY DEPT VISIT MOD MDM: CPT | Mod: 25

## 2021-08-02 PROCEDURE — 83690 ASSAY OF LIPASE: CPT

## 2021-08-02 PROCEDURE — 96374 THER/PROPH/DIAG INJ IV PUSH: CPT

## 2021-08-02 PROCEDURE — 80053 COMPREHEN METABOLIC PANEL: CPT

## 2021-08-02 PROCEDURE — 96375 TX/PRO/DX INJ NEW DRUG ADDON: CPT

## 2021-08-02 PROCEDURE — 83735 ASSAY OF MAGNESIUM: CPT

## 2021-08-02 PROCEDURE — 99284 EMERGENCY DEPT VISIT MOD MDM: CPT

## 2021-08-02 PROCEDURE — 36415 COLL VENOUS BLD VENIPUNCTURE: CPT

## 2021-08-02 PROCEDURE — 85025 COMPLETE CBC W/AUTO DIFF WBC: CPT

## 2021-08-02 PROCEDURE — 81001 URINALYSIS AUTO W/SCOPE: CPT

## 2021-08-02 RX ORDER — ONDANSETRON 8 MG/1
1 TABLET, FILM COATED ORAL
Qty: 12 | Refills: 0
Start: 2021-08-02 | End: 2021-08-05

## 2021-08-02 RX ORDER — METOCLOPRAMIDE HCL 10 MG
10 TABLET ORAL ONCE
Refills: 0 | Status: COMPLETED | OUTPATIENT
Start: 2021-08-02 | End: 2021-08-02

## 2021-08-02 RX ORDER — SODIUM CHLORIDE 9 MG/ML
1000 INJECTION, SOLUTION INTRAVENOUS ONCE
Refills: 0 | Status: COMPLETED | OUTPATIENT
Start: 2021-08-02 | End: 2021-08-02

## 2021-08-02 RX ORDER — FAMOTIDINE 10 MG/ML
20 INJECTION INTRAVENOUS ONCE
Refills: 0 | Status: COMPLETED | OUTPATIENT
Start: 2021-08-02 | End: 2021-08-02

## 2021-08-02 RX ADMIN — FAMOTIDINE 20 MILLIGRAM(S): 10 INJECTION INTRAVENOUS at 05:04

## 2021-08-02 RX ADMIN — Medication 104 MILLIGRAM(S): at 05:04

## 2021-08-02 RX ADMIN — SODIUM CHLORIDE 1000 MILLILITER(S): 9 INJECTION, SOLUTION INTRAVENOUS at 05:04

## 2021-08-02 NOTE — ED PROVIDER NOTE - PATIENT PORTAL LINK FT
You can access the FollowMyHealth Patient Portal offered by [facility] by registering at the following website: [email or web address]. By joining [facility]’s FollowMyHealth portal, you will also be able to view your health information using other applications (apps) compatible with our system.

## 2021-08-02 NOTE — ED ADULT NURSE NOTE - CHIEF COMPLAINT QUOTE
PT C/O of N/V/D associated with chills that started today.  States he thinks he ate something bad. Both him and his wife are having similar symptoms. Vomiting in triage.

## 2021-08-02 NOTE — ED PROVIDER NOTE - OBJECTIVE STATEMENT
35 y.o male hx of Afib S.p ablation presents in [place] with wife S.p having sandwich out side about 2 Pm yesterday and then about 9 pm last night start having nausea and vomiting 6 times NBNB and diarrhea few times W.o any blood . tried the Pedialyte and motrin was not helpful . pt's wife develp the same symptoms later on who had same meal

## 2021-08-02 NOTE — ED PROVIDER NOTE - PHYSICAL EXAMINATION
Const: AOX3 nontoxic appearing, no apparent respiratory or physical distress. Stable gait   HEENT: NC/AT. Moist mucous membranes.  Eyes: SILVIA. EOMI  Neck: Soft and supple. Full ROM without pain.  Cardiac: Regular rate and regular rhythm. +S1/S2. No murmurs. Peripheral pulses 2+ and symmetric. No LE edema.  Resp: Speaking in full sentences. No evidence of respiratory distress. No wheezes, rales or rhonchi. No adventitious breath sounds   Abd: Soft, non-tender, non-distended. Normal bowel sounds in all 4 quadrants. No guarding or rebound.  Back: Spine midline and non-tender. No CVAT.  Skin: No rashes, abrasions or lacerations.  Neuro: Awake, alert & oriented x 3. Moves all extremities symmetrically.

## 2021-08-02 NOTE — ED PROVIDER NOTE - NSFOLLOWUPINSTRUCTIONS_ED_ALL_ED_FT
BRAT DIET FOR DIARRHEA >> BANANA , RICE , APPLE SOUSE and TOAST   STRT FROM LIQUIDS TO SOLID SLOWLY    CALL AND FOLLOW UP WITH PRIMARY CARE WITHIN 24- 48 HOURS   JOSE MARIAFRAN AS NEED IT FOR NAuSEA   Viral Gastroenteritis, Adult       Viral gastroenteritis is also known as the stomach flu. This condition may affect your stomach, small intestine, and large intestine. It can cause sudden watery diarrhea, fever, and vomiting. This condition is caused by many different viruses. These viruses can be passed from person to person very easily (are contagious).    Diarrhea and vomiting can make you feel weak and cause you to become dehydrated. You may not be able to keep fluids down. Dehydration can make you tired and thirsty, cause you to have a dry mouth, and decrease how often you urinate. It is important to replace the fluids that you lose from diarrhea and vomiting.      What are the causes?  Gastroenteritis is caused by many viruses, including rotavirus and norovirus. Norovirus is the most common cause in adults. You can get sick after being exposed to the viruses from other people. You can also get sick by:  •Eating food, drinking water, or touching a surface contaminated with one of these viruses.      •Sharing utensils or other personal items with an infected person.        What increases the risk?  You are more likely to develop this condition if you:  •Have a weak body defense system (immune system).      •Live with one or more children who are younger than 2 years old.      •Live in a nursing home.      •Travel on cruise ships.        What are the signs or symptoms?  Symptoms of this condition start suddenly 1–3 days after exposure to a virus. Symptoms may last for a few days or for as long as a week. Common symptoms include watery diarrhea and vomiting. Other symptoms include:  •Fever.      •Headache.      •Fatigue.      •Pain in the abdomen.      •Chills.      •Weakness.      •Nausea.      •Muscle aches.      •Loss of appetite.        How is this diagnosed?    This condition is diagnosed with a medical history and physical exam. You may also have a stool test to check for viruses or other infections.      How is this treated?  This condition typically goes away on its own. The focus of treatment is to prevent dehydration and restore lost fluids (rehydration). This condition may be treated with:  •An oral rehydration solution (ORS) to replace important salts and minerals (electrolytes) in your body. Take this if told by your health care provider. This is a drink that is sold at pharmacies and retail stores.      •Medicines to help with your symptoms.      •Probiotic supplements to reduce symptoms of diarrhea.      •Fluids given through an IV, if dehydration is severe.      Older adults and people with other diseases or a weak immune system are at higher risk for dehydration.      Follow these instructions at home:       Eating and drinking      •Take an ORS as told by your health care provider.    •Drink clear fluids in small amounts as you are able. Clear fluids include:  •Water.      •Ice chips.      •Diluted fruit juice.      •Low-calorie sports drinks.        •Drink enough fluid to keep your urine pale yellow.      •Eat small amounts of healthy foods every 3–4 hours as you are able. This may include whole grains, fruits, vegetables, lean meats, and yogurt.      •Avoid fluids that contain a lot of sugar or caffeine, such as energy drinks, sports drinks, and soda.      •Avoid spicy or fatty foods.      •Avoid alcohol.      General instructions     •Wash your hands often, especially after having diarrhea or vomiting. If soap and water are not available, use hand .      •Make sure that all people in your household wash their hands well and often.      •Take over-the-counter and prescription medicines only as told by your health care provider.      •Rest at home while you recover.      •Watch your condition for any changes.      •Take a warm bath to relieve any burning or pain from frequent diarrhea episodes.      •Keep all follow-up visits as told by your health care provider. This is important.        Contact a health care provider if you:    •Cannot keep fluids down.      •Have symptoms that get worse.      •Have new symptoms.      •Feel light-headed or dizzy.      •Have muscle cramps.        Get help right away if you:    •Have chest pain.      •Feel extremely weak or you faint.      •See blood in your vomit.      •Have vomit that looks like coffee grounds.      •Have bloody or black stools or stools that look like tar.      •Have a severe headache, a stiff neck, or both.      •Have a rash.      •Have severe pain, cramping, or bloating in your abdomen.      •Have trouble breathing or you are breathing very quickly.      •Have a fast heartbeat.      •Have skin that feels cold and clammy.      •Feel confused.      •Have pain when you urinate.    •Have signs of dehydration, such as:  •Dark urine, very little urine, or no urine.      •Cracked lips.      •Dry mouth.      •Sunken eyes.      •Sleepiness.      •Weakness.          Summary    •Viral gastroenteritis is also known as the stomach flu. It can cause sudden watery diarrhea, fever, and vomiting.      •This condition can be passed from person to person very easily (is contagious).      •Take an ORS if told by your health care provider. This is a drink that is sold at pharmacies and retail stores.      •Wash your hands often, especially after having diarrhea or vomiting. If soap and water are not available, use hand .

## 2021-08-02 NOTE — ED ADULT NURSE NOTE - OBJECTIVE STATEMENT
patient here with wife states they ate together and both became ill with vomiting nause and body aches

## 2021-08-02 NOTE — ED PROVIDER NOTE - ATTENDING CONTRIBUTION TO CARE
34 yo male presents for evaluation of acute then recurrent nausea, vomiting, after eating a spinach/ chicken sandwich tonight. Patient is well appearing with normal examination. I personally saw the patient with the PA, and completed the key components of the history and physical exam. I then discussed the management plan with the PA. 34 yo male presents for evaluation of acute then recurrent nausea, vomiting, after eating a spinach/ chicken sandwich tonight. Patient is well appearing with normal examination. I personally saw the patient with the resident, and completed the key components of the history and physical exam. I then discussed the management plan with the resident. 36 yo male presents for evaluation of acute then recurrent nausea, vomiting, after eating a spinach/ chicken sandwich tonight. Patient is well appearing with normal examination. I personally saw the patient with the PA, and completed the key components of the history and physical exam. I then discussed the management plan with the PA.

## 2021-08-02 NOTE — ED ADULT NURSE NOTE - AGENT'S NAME
[facility] has stopped her nursing and as of now family is fine with taking care of her, spoke with Calin Reyes and she would just like something called in so she can put it on her daughter Deisy Cho

## 2021-08-02 NOTE — ED PROVIDER NOTE - PMH
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

## 2022-04-02 NOTE — ASU PATIENT PROFILE, ADULT - PATIENT REPRESENTATIVE NAME
Patient educated on discharge instructions as well as new medications use, dosage, administration and possible side effects. Patient verified knowledge. IV removed without difficulty and dry dressing in place. Telemetry monitor removed and returned to [facility]. Pt left facility in stable condition to Home with all of their personal belongings. [place]

## 2022-05-06 NOTE — ED ADULT TRIAGE NOTE - WEIGHT IN LBS
Initial Anesthesia Post-op Note    Patient: Tobi Cevallos  Procedure(s) Performed: COLONOSCOPY  Anesthesia type: MAC    Vitals Value Taken Time   Temp 36.1 05/06/22 1218   Pulse 71 05/06/22 1218   Resp 21 05/06/22 1218   SpO2 100% 05/06/22 1218   /54 05/06/22 1218         Patient Location: PACU Phase 1  Post-op Vital Signs:stable  Level of Consciousness: awake and alert  Respiratory Status: spontaneous ventilation  Cardiovascular stable  Hydration: euvolemic  Pain Management: adequately controlled  Handoff: Handoff to receiving nurse was performed and questions were answered  Vomiting: none  Nausea: None  Airway Patency:patent  Post-op Assessment: no complications, patient tolerated procedure well with no complications, no evidence of recall and dentition within defined limits      No complications documented.  
214.9

## 2022-07-12 NOTE — ED ADULT TRIAGE NOTE - CHIEF COMPLAINT QUOTE
No
PT C/O of N/V/D associated with chills that started today.  States he thinks he ate something bad. Both him and his wife are having similar symptoms. Vomiting in triage.

## 2022-11-16 ENCOUNTER — APPOINTMENT (OUTPATIENT)
Dept: FAMILY MEDICINE | Facility: CLINIC | Age: 37
End: 2022-11-16

## 2022-11-16 VITALS
HEIGHT: 70 IN | HEART RATE: 76 BPM | RESPIRATION RATE: 16 BRPM | OXYGEN SATURATION: 98 % | SYSTOLIC BLOOD PRESSURE: 142 MMHG | WEIGHT: 192 LBS | BODY MASS INDEX: 27.49 KG/M2 | DIASTOLIC BLOOD PRESSURE: 83 MMHG | TEMPERATURE: 98.6 F

## 2022-11-16 DIAGNOSIS — Z23 ENCOUNTER FOR IMMUNIZATION: ICD-10-CM

## 2022-11-16 DIAGNOSIS — Z00.00 ENCOUNTER FOR GENERAL ADULT MEDICAL EXAMINATION W/OUT ABNORMAL FINDINGS: ICD-10-CM

## 2022-11-16 DIAGNOSIS — Z01.818 ENCOUNTER FOR OTHER PREPROCEDURAL EXAMINATION: ICD-10-CM

## 2022-11-16 DIAGNOSIS — I47.1 SUPRAVENTRICULAR TACHYCARDIA: ICD-10-CM

## 2022-11-16 DIAGNOSIS — J45.20 MILD INTERMITTENT ASTHMA, UNCOMPLICATED: ICD-10-CM

## 2022-11-16 DIAGNOSIS — J45.30 MILD PERSISTENT ASTHMA, UNCOMPLICATED: ICD-10-CM

## 2022-11-16 DIAGNOSIS — I48.91 UNSPECIFIED ATRIAL FIBRILLATION: ICD-10-CM

## 2022-11-16 LAB
BILIRUB UR QL STRIP: NORMAL
CLARITY UR: CLEAR
COLLECTION METHOD: NORMAL
GLUCOSE UR-MCNC: NORMAL
HCG UR QL: 0.2 EU/DL
HGB UR QL STRIP.AUTO: NORMAL
KETONES UR-MCNC: NORMAL
LEUKOCYTE ESTERASE UR QL STRIP: NORMAL
NITRITE UR QL STRIP: NORMAL
PH UR STRIP: 6
PROT UR STRIP-MCNC: NORMAL
SP GR UR STRIP: 1.01

## 2022-11-16 PROCEDURE — G0008: CPT

## 2022-11-16 PROCEDURE — 99385 PREV VISIT NEW AGE 18-39: CPT | Mod: 25

## 2022-11-16 PROCEDURE — 81003 URINALYSIS AUTO W/O SCOPE: CPT | Mod: QW

## 2022-11-16 PROCEDURE — 90686 IIV4 VACC NO PRSV 0.5 ML IM: CPT

## 2022-11-16 RX ORDER — KETOCONAZOLE FOAM 20 MG/G
2 AEROSOL, FOAM TOPICAL
Qty: 50 | Refills: 0 | Status: COMPLETED | COMMUNITY
Start: 2022-09-12

## 2022-11-16 RX ORDER — KETOCONAZOLE 20.5 MG/ML
2 SHAMPOO, SUSPENSION TOPICAL
Qty: 120 | Refills: 0 | Status: COMPLETED | COMMUNITY
Start: 2022-10-25

## 2022-11-16 RX ORDER — LEVALBUTEROL TARTRATE 45 UG/1
45 AEROSOL, METERED ORAL
Qty: 1 | Refills: 2 | Status: ACTIVE | COMMUNITY
Start: 2022-11-16 | End: 1900-01-01

## 2022-11-20 NOTE — HISTORY OF PRESENT ILLNESS
[FreeTextEntry1] : Patient is here for physical\par \par  [de-identified] : Mr. CARA TAYLOR is a 37 year M presents to the office for a new patient physical. Patient reports he is doing well, no acute complaints.

## 2022-11-20 NOTE — HEALTH RISK ASSESSMENT
[Very Good] : ~his/her~  mood as very good [Never] : Never [Yes] : Yes [Never (0 pts)] : Never (0 points) [1 or 2 (0 pts)] : 1 or 2 (0 points) [Monthly (2 pts)] : Monthly (2 points) [No] : In the past 12 months have you used drugs other than those required for medical reasons? No [No falls in past year] : Patient reported no falls in the past year [0] : 2) Feeling down, depressed, or hopeless: Not at all (0) [PHQ-2 Negative - No further assessment needed] : PHQ-2 Negative - No further assessment needed [With Significant Other] : lives with significant other [Employed] : employed [] :  [Audit-CScore] : 2 [[id number]] : 0

## 2022-11-20 NOTE — PHYSICAL EXAM
[No Acute Distress] : no acute distress [Well Nourished] : well nourished [Well Developed] : well developed [Well-Appearing] : well-appearing [Normal Sclera/Conjunctiva] : normal sclera/conjunctiva [PERRL] : pupils equal round and reactive to light [EOMI] : extraocular movements intact [Normal Outer Ear/Nose] : the outer ears and nose were normal in appearance [Normal Oropharynx] : the oropharynx was normal [No Lymphadenopathy] : no lymphadenopathy [Supple] : supple [Thyroid Normal, No Nodules] : the thyroid was normal and there were no nodules present [No Respiratory Distress] : no respiratory distress  [No Accessory Muscle Use] : no accessory muscle use [Clear to Auscultation] : lungs were clear to auscultation bilaterally [Normal Rate] : normal rate  [Regular Rhythm] : with a regular rhythm [Normal S1, S2] : normal S1 and S2 [No Murmur] : no murmur heard [No Abdominal Bruit] : a ~M bruit was not heard ~T in the abdomen [Pedal Pulses Present] : the pedal pulses are present [No Edema] : there was no peripheral edema [No Palpable Aorta] : no palpable aorta [No Extremity Clubbing/Cyanosis] : no extremity clubbing/cyanosis [Soft] : abdomen soft [Non Tender] : non-tender [Non-distended] : non-distended [No Masses] : no abdominal mass palpated [No HSM] : no HSM [Normal Bowel Sounds] : normal bowel sounds [No CVA Tenderness] : no CVA  tenderness [No Spinal Tenderness] : no spinal tenderness [No Joint Swelling] : no joint swelling [Grossly Normal Strength/Tone] : grossly normal strength/tone [No Rash] : no rash [Coordination Grossly Intact] : coordination grossly intact [No Focal Deficits] : no focal deficits [Normal Gait] : normal gait [Normal Affect] : the affect was normal [Normal Insight/Judgement] : insight and judgment were intact

## 2022-11-21 LAB
CHOLEST SERPL-MCNC: 145 MG/DL
HDLC SERPL-MCNC: 34 MG/DL
LDLC SERPL CALC-MCNC: 87 MG/DL
NONHDLC SERPL-MCNC: 111 MG/DL
TRIGL SERPL-MCNC: 117 MG/DL

## 2023-02-01 NOTE — HISTORY OF PRESENT ILLNESS
[Palpitations] : palpitations [SOB] : dyspnea [Dizziness] : dizziness [de-identified] : patient presented for linq interrogation, shows sinus rhythm, on 12/3 and 12/4 there is several episodes recorded as AFib , some appear to be sinus with frequent APC, one episode atrial fibrillation with rapid ventricular rates lasting 4hrs, and pt reports symptoms of dizziness, palpiations, sob and diaphoresis. Pt with h/p SVT and Afib ablation in january 2017 followed by repeat catheter ablation of AVNRT in april 2018 by Dr Dent.  Pt currently not on any medication, chadsvasc score 0.   oral

## 2023-05-09 NOTE — ED ADULT TRIAGE NOTE - HEIGHT IN CM
Multiple attempts have been made to schedule the patient for Physical Therapy/Occupational Therapy.  We will remove order until further notice.     177.8

## 2023-08-02 NOTE — ED PROVIDER NOTE - NS_EDPROVIDERDISPOUSERTYPE_ED_A_ED
Attending Attestation (For Attendings USE Only)... Terbinafine Pregnancy And Lactation Text: This medication is Pregnancy Category B and is considered safe during pregnancy. It is also excreted in breast milk and breast feeding isn't recommended.

## 2024-05-31 NOTE — ED PROVIDER NOTE - CROS ED ENMT ALL NEG
Call placed to pt  Relayed U/S results and new OB appt    Pt verbalized understanding and agrees to the plan    Thank you  Esme Carlson RN on 7/21/2022 at 12:26 PM   Pt has appointment with Dr. Torres on 8/11/22.  Abnormal pelvic US with postmenopausal bleeding.  Unable to reach pt-left VM to call back. If she calls back please transfer to RN.    MA/TC:  Can you please call OBGYN or route to appropriate pool to get her scheduled this week or next week at latest with OBGYN. Once scheduled can you route encounter to RN to have them re-attempt to contact pt to discuss results ( I am off this afternoon)    Thanks    Renee Aldana, APRN, CNP     UPDATE FOR TRIAGE: I called and spoke with OBGYN and they were able to move up the appointment to 7/26/22 at 12pm in [place] with Dr. Schmitt.   Heather Womack on 7/21/2022 at 12:04 PM    Urgent Pelvic U/S results    IMPRESSION:  Endometrium is heterogeneous and abnormally thickened for  a postmenopausal patient, measuring 16 mm. Findings are suspicious for  endometrial neoplasm, and gynecologic consultation is recommended.     Read by Dr. Ede Alfaro # [phone]    Routing to ordering provider    Thank you  Esme Carlson RN on 7/21/2022 at 11:32 AM   No difficulties negative...

## 2024-07-17 ENCOUNTER — NON-APPOINTMENT (OUTPATIENT)
Age: 39
End: 2024-07-17

## 2024-08-26 NOTE — ED ADULT TRIAGE NOTE - TEMPERATURE IN CELSIUS (DEGREES C)
Left detailed vm to call back and schedule and that colonoscopy will be addressed at appointment.    36.7

## 2025-03-05 ENCOUNTER — NON-APPOINTMENT (OUTPATIENT)
Age: 40
End: 2025-03-05

## 2025-03-05 ENCOUNTER — APPOINTMENT (OUTPATIENT)
Dept: FAMILY MEDICINE | Facility: CLINIC | Age: 40
End: 2025-03-05
Payer: COMMERCIAL

## 2025-03-05 VITALS
OXYGEN SATURATION: 98 % | SYSTOLIC BLOOD PRESSURE: 148 MMHG | HEIGHT: 70 IN | BODY MASS INDEX: 26.34 KG/M2 | HEART RATE: 72 BPM | WEIGHT: 184 LBS | DIASTOLIC BLOOD PRESSURE: 91 MMHG

## 2025-03-05 DIAGNOSIS — Z00.00 ENCOUNTER FOR GENERAL ADULT MEDICAL EXAMINATION W/OUT ABNORMAL FINDINGS: ICD-10-CM

## 2025-03-05 DIAGNOSIS — Z13.6 ENCOUNTER FOR SCREENING FOR CARDIOVASCULAR DISORDERS: ICD-10-CM

## 2025-03-05 DIAGNOSIS — R03.0 ELEVATED BLOOD-PRESSURE READING, W/OUT DIAGNOSIS OF HYPERTENSION: ICD-10-CM

## 2025-03-05 DIAGNOSIS — Z86.79 PERSONAL HISTORY OF OTHER DISEASES OF THE CIRCULATORY SYSTEM: ICD-10-CM

## 2025-03-05 PROCEDURE — 99396 PREV VISIT EST AGE 40-64: CPT

## 2025-03-05 PROCEDURE — 36415 COLL VENOUS BLD VENIPUNCTURE: CPT

## 2025-03-05 PROCEDURE — 93000 ELECTROCARDIOGRAM COMPLETE: CPT

## 2025-03-07 PROBLEM — R03.0 ELEVATED BLOOD PRESSURE READING: Status: ACTIVE | Noted: 2025-03-07

## 2025-03-14 LAB
25(OH)D3 SERPL-MCNC: 36.5 NG/ML
ALBUMIN SERPL ELPH-MCNC: 5 G/DL
ALP BLD-CCNC: 64 U/L
ALT SERPL-CCNC: 25 U/L
ANION GAP SERPL CALC-SCNC: 13 MMOL/L
APPEARANCE: CLEAR
AST SERPL-CCNC: 23 U/L
BACTERIA: NEGATIVE /HPF
BASOPHILS # BLD AUTO: 0.04 K/UL
BASOPHILS NFR BLD AUTO: 0.6 %
BILIRUB SERPL-MCNC: 0.6 MG/DL
BILIRUBIN URINE: NEGATIVE
BLOOD URINE: NEGATIVE
BUN SERPL-MCNC: 11 MG/DL
CALCIUM SERPL-MCNC: 9.5 MG/DL
CAST: 0 /LPF
CHLORIDE SERPL-SCNC: 105 MMOL/L
CHOLEST SERPL-MCNC: 158 MG/DL
CO2 SERPL-SCNC: 26 MMOL/L
COLOR: YELLOW
CREAT SERPL-MCNC: 0.81 MG/DL
EGFRCR SERPLBLD CKD-EPI 2021: 114 ML/MIN/1.73M2
EOSINOPHIL # BLD AUTO: 0.03 K/UL
EOSINOPHIL NFR BLD AUTO: 0.5 %
EPITHELIAL CELLS: 0 /HPF
ESTIMATED AVERAGE GLUCOSE: 108 MG/DL
GLUCOSE QUALITATIVE U: NEGATIVE MG/DL
GLUCOSE SERPL-MCNC: 88 MG/DL
HBA1C MFR BLD HPLC: 5.4 %
HCT VFR BLD CALC: 48.6 %
HDLC SERPL-MCNC: 45 MG/DL
HGB BLD-MCNC: 16.5 G/DL
IMM GRANULOCYTES NFR BLD AUTO: 0.2 %
KETONES URINE: NEGATIVE MG/DL
LDLC SERPL CALC-MCNC: 101 MG/DL
LEUKOCYTE ESTERASE URINE: NEGATIVE
LYMPHOCYTES # BLD AUTO: 2.21 K/UL
LYMPHOCYTES NFR BLD AUTO: 34.5 %
MAN DIFF?: NORMAL
MCHC RBC-ENTMCNC: 30.7 PG
MCHC RBC-ENTMCNC: 34 G/DL
MCV RBC AUTO: 90.3 FL
MICROSCOPIC-UA: NORMAL
MONOCYTES # BLD AUTO: 0.39 K/UL
MONOCYTES NFR BLD AUTO: 6.1 %
NEUTROPHILS # BLD AUTO: 3.72 K/UL
NEUTROPHILS NFR BLD AUTO: 58.1 %
NITRITE URINE: NEGATIVE
NONHDLC SERPL-MCNC: 113 MG/DL
PH URINE: 6.5
PLATELET # BLD AUTO: 168 K/UL
POTASSIUM SERPL-SCNC: 4.5 MMOL/L
PROT SERPL-MCNC: 7.3 G/DL
PROTEIN URINE: NEGATIVE MG/DL
RBC # BLD: 5.38 M/UL
RBC # FLD: 12.8 %
RED BLOOD CELLS URINE: 0 /HPF
SODIUM SERPL-SCNC: 143 MMOL/L
SPECIFIC GRAVITY URINE: 1.01
TRIGL SERPL-MCNC: 57 MG/DL
TSH SERPL-ACNC: 1.26 UIU/ML
UROBILINOGEN URINE: 0.2 MG/DL
WBC # FLD AUTO: 6.4 K/UL
WHITE BLOOD CELLS URINE: 0 /HPF